# Patient Record
Sex: FEMALE | Race: OTHER | HISPANIC OR LATINO | URBAN - METROPOLITAN AREA
[De-identification: names, ages, dates, MRNs, and addresses within clinical notes are randomized per-mention and may not be internally consistent; named-entity substitution may affect disease eponyms.]

---

## 2020-04-06 ENCOUNTER — EMERGENCY (EMERGENCY)
Facility: HOSPITAL | Age: 35
LOS: 0 days | Discharge: HOME | End: 2020-04-06
Attending: EMERGENCY MEDICINE | Admitting: EMERGENCY MEDICINE
Payer: MEDICAID

## 2020-04-06 VITALS
HEART RATE: 99 BPM | DIASTOLIC BLOOD PRESSURE: 50 MMHG | TEMPERATURE: 99 F | OXYGEN SATURATION: 99 % | SYSTOLIC BLOOD PRESSURE: 105 MMHG | RESPIRATION RATE: 20 BRPM

## 2020-04-06 VITALS
RESPIRATION RATE: 16 BRPM | HEART RATE: 125 BPM | TEMPERATURE: 103 F | DIASTOLIC BLOOD PRESSURE: 81 MMHG | SYSTOLIC BLOOD PRESSURE: 142 MMHG | OXYGEN SATURATION: 96 %

## 2020-04-06 DIAGNOSIS — R50.9 FEVER, UNSPECIFIED: ICD-10-CM

## 2020-04-06 DIAGNOSIS — Z91.013 ALLERGY TO SEAFOOD: ICD-10-CM

## 2020-04-06 DIAGNOSIS — R05 COUGH: ICD-10-CM

## 2020-04-06 DIAGNOSIS — R10.9 UNSPECIFIED ABDOMINAL PAIN: ICD-10-CM

## 2020-04-06 DIAGNOSIS — R30.0 DYSURIA: ICD-10-CM

## 2020-04-06 DIAGNOSIS — R11.10 VOMITING, UNSPECIFIED: ICD-10-CM

## 2020-04-06 DIAGNOSIS — Z91.040 LATEX ALLERGY STATUS: ICD-10-CM

## 2020-04-06 LAB
ALBUMIN SERPL ELPH-MCNC: 4.4 G/DL — SIGNIFICANT CHANGE UP (ref 3.5–5.2)
ALP SERPL-CCNC: 72 U/L — SIGNIFICANT CHANGE UP (ref 30–115)
ALT FLD-CCNC: 17 U/L — SIGNIFICANT CHANGE UP (ref 0–41)
ANION GAP SERPL CALC-SCNC: 12 MMOL/L — SIGNIFICANT CHANGE UP (ref 7–14)
APPEARANCE UR: CLEAR — SIGNIFICANT CHANGE UP
AST SERPL-CCNC: 16 U/L — SIGNIFICANT CHANGE UP (ref 0–41)
BACTERIA # UR AUTO: NEGATIVE — SIGNIFICANT CHANGE UP
BASOPHILS # BLD AUTO: 0.01 K/UL — SIGNIFICANT CHANGE UP (ref 0–0.2)
BASOPHILS NFR BLD AUTO: 0.2 % — SIGNIFICANT CHANGE UP (ref 0–1)
BILIRUB DIRECT SERPL-MCNC: <0.2 MG/DL — SIGNIFICANT CHANGE UP (ref 0–0.2)
BILIRUB INDIRECT FLD-MCNC: >0.4 MG/DL — SIGNIFICANT CHANGE UP (ref 0.2–1.2)
BILIRUB SERPL-MCNC: 0.6 MG/DL — SIGNIFICANT CHANGE UP (ref 0.2–1.2)
BILIRUB UR-MCNC: NEGATIVE — SIGNIFICANT CHANGE UP
BUN SERPL-MCNC: 10 MG/DL — SIGNIFICANT CHANGE UP (ref 10–20)
CALCIUM SERPL-MCNC: 9 MG/DL — SIGNIFICANT CHANGE UP (ref 8.5–10.1)
CHLORIDE SERPL-SCNC: 99 MMOL/L — SIGNIFICANT CHANGE UP (ref 98–110)
CO2 SERPL-SCNC: 24 MMOL/L — SIGNIFICANT CHANGE UP (ref 17–32)
COLOR SPEC: YELLOW — SIGNIFICANT CHANGE UP
CREAT SERPL-MCNC: 0.8 MG/DL — SIGNIFICANT CHANGE UP (ref 0.7–1.5)
DIFF PNL FLD: NEGATIVE — SIGNIFICANT CHANGE UP
EOSINOPHIL # BLD AUTO: 0 K/UL — SIGNIFICANT CHANGE UP (ref 0–0.7)
EOSINOPHIL NFR BLD AUTO: 0 % — SIGNIFICANT CHANGE UP (ref 0–8)
EPI CELLS # UR: 5 /HPF — SIGNIFICANT CHANGE UP (ref 0–5)
GLUCOSE SERPL-MCNC: 104 MG/DL — HIGH (ref 70–99)
GLUCOSE UR QL: NEGATIVE — SIGNIFICANT CHANGE UP
HCT VFR BLD CALC: 42 % — SIGNIFICANT CHANGE UP (ref 37–47)
HGB BLD-MCNC: 14.2 G/DL — SIGNIFICANT CHANGE UP (ref 12–16)
HYALINE CASTS # UR AUTO: 0 /LPF — SIGNIFICANT CHANGE UP (ref 0–7)
IMM GRANULOCYTES NFR BLD AUTO: 0.4 % — HIGH (ref 0.1–0.3)
KETONES UR-MCNC: NEGATIVE — SIGNIFICANT CHANGE UP
LACTATE SERPL-SCNC: 0.9 MMOL/L — SIGNIFICANT CHANGE UP (ref 0.7–2)
LEUKOCYTE ESTERASE UR-ACNC: NEGATIVE — SIGNIFICANT CHANGE UP
LIDOCAIN IGE QN: 44 U/L — SIGNIFICANT CHANGE UP (ref 7–60)
LYMPHOCYTES # BLD AUTO: 0.87 K/UL — LOW (ref 1.2–3.4)
LYMPHOCYTES # BLD AUTO: 19.4 % — LOW (ref 20.5–51.1)
MAGNESIUM SERPL-MCNC: 2.2 MG/DL — SIGNIFICANT CHANGE UP (ref 1.8–2.4)
MCHC RBC-ENTMCNC: 28.3 PG — SIGNIFICANT CHANGE UP (ref 27–31)
MCHC RBC-ENTMCNC: 33.8 G/DL — SIGNIFICANT CHANGE UP (ref 32–37)
MCV RBC AUTO: 83.8 FL — SIGNIFICANT CHANGE UP (ref 81–99)
MONOCYTES # BLD AUTO: 0.37 K/UL — SIGNIFICANT CHANGE UP (ref 0.1–0.6)
MONOCYTES NFR BLD AUTO: 8.2 % — SIGNIFICANT CHANGE UP (ref 1.7–9.3)
NEUTROPHILS # BLD AUTO: 3.22 K/UL — SIGNIFICANT CHANGE UP (ref 1.4–6.5)
NEUTROPHILS NFR BLD AUTO: 71.8 % — SIGNIFICANT CHANGE UP (ref 42.2–75.2)
NITRITE UR-MCNC: NEGATIVE — SIGNIFICANT CHANGE UP
NRBC # BLD: 0 /100 WBCS — SIGNIFICANT CHANGE UP (ref 0–0)
PH UR: 8 — SIGNIFICANT CHANGE UP (ref 5–8)
PLATELET # BLD AUTO: 196 K/UL — SIGNIFICANT CHANGE UP (ref 130–400)
POTASSIUM SERPL-MCNC: 4.2 MMOL/L — SIGNIFICANT CHANGE UP (ref 3.5–5)
POTASSIUM SERPL-SCNC: 4.2 MMOL/L — SIGNIFICANT CHANGE UP (ref 3.5–5)
PROT SERPL-MCNC: 8 G/DL — SIGNIFICANT CHANGE UP (ref 6–8)
PROT UR-MCNC: ABNORMAL
RBC # BLD: 5.01 M/UL — SIGNIFICANT CHANGE UP (ref 4.2–5.4)
RBC # FLD: 13.2 % — SIGNIFICANT CHANGE UP (ref 11.5–14.5)
RBC CASTS # UR COMP ASSIST: 1 /HPF — SIGNIFICANT CHANGE UP (ref 0–4)
SODIUM SERPL-SCNC: 135 MMOL/L — SIGNIFICANT CHANGE UP (ref 135–146)
SP GR SPEC: 1.02 — SIGNIFICANT CHANGE UP (ref 1.01–1.02)
UROBILINOGEN FLD QL: SIGNIFICANT CHANGE UP
WBC # BLD: 4.49 K/UL — LOW (ref 4.8–10.8)
WBC # FLD AUTO: 4.49 K/UL — LOW (ref 4.8–10.8)
WBC UR QL: 1 /HPF — SIGNIFICANT CHANGE UP (ref 0–5)

## 2020-04-06 PROCEDURE — 74177 CT ABD & PELVIS W/CONTRAST: CPT | Mod: 26

## 2020-04-06 PROCEDURE — 71046 X-RAY EXAM CHEST 2 VIEWS: CPT | Mod: 26

## 2020-04-06 PROCEDURE — 99285 EMERGENCY DEPT VISIT HI MDM: CPT

## 2020-04-06 RX ORDER — SODIUM CHLORIDE 9 MG/ML
2000 INJECTION, SOLUTION INTRAVENOUS ONCE
Refills: 0 | Status: COMPLETED | OUTPATIENT
Start: 2020-04-06 | End: 2020-04-06

## 2020-04-06 RX ORDER — DEXAMETHASONE 0.5 MG/5ML
10 ELIXIR ORAL ONCE
Refills: 0 | Status: COMPLETED | OUTPATIENT
Start: 2020-04-06 | End: 2020-04-06

## 2020-04-06 RX ORDER — ACETAMINOPHEN 500 MG
975 TABLET ORAL ONCE
Refills: 0 | Status: COMPLETED | OUTPATIENT
Start: 2020-04-06 | End: 2020-04-06

## 2020-04-06 RX ORDER — MORPHINE SULFATE 50 MG/1
4 CAPSULE, EXTENDED RELEASE ORAL ONCE
Refills: 0 | Status: DISCONTINUED | OUTPATIENT
Start: 2020-04-06 | End: 2020-04-06

## 2020-04-06 RX ORDER — SODIUM CHLORIDE 9 MG/ML
500 INJECTION, SOLUTION INTRAVENOUS ONCE
Refills: 0 | Status: COMPLETED | OUTPATIENT
Start: 2020-04-06 | End: 2020-04-06

## 2020-04-06 RX ORDER — DIPHENHYDRAMINE HCL 50 MG
25 CAPSULE ORAL ONCE
Refills: 0 | Status: COMPLETED | OUTPATIENT
Start: 2020-04-06 | End: 2020-04-06

## 2020-04-06 RX ADMIN — Medication 10 MILLIGRAM(S): at 14:50

## 2020-04-06 RX ADMIN — SODIUM CHLORIDE 500 MILLILITER(S): 9 INJECTION, SOLUTION INTRAVENOUS at 15:35

## 2020-04-06 RX ADMIN — SODIUM CHLORIDE 2000 MILLILITER(S): 9 INJECTION, SOLUTION INTRAVENOUS at 12:38

## 2020-04-06 RX ADMIN — Medication 25 MILLIGRAM(S): at 14:50

## 2020-04-06 RX ADMIN — MORPHINE SULFATE 4 MILLIGRAM(S): 50 CAPSULE, EXTENDED RELEASE ORAL at 12:38

## 2020-04-06 RX ADMIN — Medication 975 MILLIGRAM(S): at 12:37

## 2020-04-06 NOTE — ED PROVIDER NOTE - PATIENT PORTAL LINK FT
You can access the FollowMyHealth Patient Portal offered by [facility] by registering at the following website: [email or web address]. By joining [employer]’s FollowMyHealth portal, you will also be able to view your health information using other applications (apps) compatible with our system.

## 2020-04-06 NOTE — ED ADULT NURSE NOTE - OBJECTIVE STATEMENT
Right side flank and mid back pain, pt is febrile 102.7. Pt recently dx with PNA on po abx. Denies nausea at this time, no dysuria or hematuria

## 2020-04-06 NOTE — ED PROVIDER NOTE - CLINICAL SUMMARY MEDICAL DECISION MAKING FREE TEXT BOX
35 female here for flank pain.  Had screening labs imaging supportive care medications and reevaluation, symptoms improved, plan discussed with patient, will discharge with outpatient management and return and follow up instructions.

## 2020-04-06 NOTE — ED PROVIDER NOTE - NS ED ROS FT
Constitutional: (-) fever  Eyes/ENT: (-) blurry vision, (-) epistaxis  Cardiovascular: (-) chest pain, (-) syncope  Respiratory: (-) cough, (-) shortness of breath  Gastrointestinal: (-) vomiting, (-) diarrhea  Musculoskeletal: (-) neck pain, (-) back pain, (-) joint pain  Integumentary: (-) rash, (-) edema  Neurological: (-) headache, (-) altered mental status  Psychiatric: (-) hallucinations  Allergic/Immunologic: (-) pruritus Constitutional: (+) fever  Eyes/ENT: (-) blurry vision, (-) epistaxis  Cardiovascular: (-) chest pain, (-) syncope  Respiratory: (-) cough, (-) shortness of breath  Gastrointestinal: (+) vomiting, (-) diarrhea  Musculoskeletal: (-) neck pain, (-) back pain, (-) joint pain  Integumentary: (-) rash, (-) edema  Neurological: (-) headache, (-) altered mental status  Psychiatric: (-) hallucinations  Allergic/Immunologic: (-) pruritus

## 2020-04-06 NOTE — ED PROVIDER NOTE - PHYSICAL EXAMINATION
VITAL SIGNS: I have reviewed nursing notes and confirm.  CONSTITUTIONAL: Well-developed; well-nourished; in no acute distress. .  SKIN: skin exam is warm and dry, no acute rash.   HEAD: Normocephalic; atraumatic.  EYES:  EOM intact; conjunctiva and sclera clear.  ENT: No nasal discharge; airway clear. moist oral mucosa;     NECK: Supple; non tender.  CARD: S1, S2 normal; no murmurs, gallops, or rubs. Regular rate and rhythm. posterior tibial and radial pulses 2+  RESP: No wheezes, rales or rhonchi. cta b/l. no use of accessory muscles. no retractions  ABD: Normal bowel sounds; soft; non-distended; non-tender; no rebound.   back: b/l cva tenderness  EXT: Normal ROM. No  cyanosis or edema.  LYMPH: No acute cervical adenopathy.  NEURO: Alert, oriented, grossly unremarkable.    PSYCH: Cooperative, appropriate.

## 2020-04-06 NOTE — ED PROVIDER NOTE - ATTENDING CONTRIBUTION TO CARE
I personally evaluated the patient. I reviewed the Resident’s or Physician Assistant’s note (as assigned above), and agree with the findings and plan except as documented in my note.     35 female here for right flank pain for several days not improving. Admits to vomiting as well, concerned for pyelonephritis.    Recent outpatient workup for similar and diagnosed as pneumonia and given Rx Zithromax.     ROS otherwise unremarkable    PE: female in no distress. CV: pulses intact. CHEST: normal work of breathing. ABD: nondistended. SKIN: normal. EXT: FROM. NEURO: AAO 3 no focal deficits. HEENT: mucosa normal non icteric    Impression: flank pain    Plan: IV labs imaging supportive care and reevaluation

## 2020-04-06 NOTE — ED PROVIDER NOTE - OBJECTIVE STATEMENT
34y/o F w/ hx of asthma, PCOS presents for b/l flank pain r>l constant stabbing pain that feels like pyelo 2-3 days ago, pt with mild dysuria 4 days ago. has taken 3 days of zpack and cefdinir for pneumonia.  pt has been feeling feversih for the last 3 days.  +3 episodes of vomit.  denies sob. no cp.  no diarrhea. no vaginal complaints.

## 2020-04-06 NOTE — ED PROVIDER NOTE - NSFOLLOWUPINSTRUCTIONS_ED_ALL_ED_FT
Please finish antibiotics.  ****If you wish to be tested for the COVID virus please call [phone] for appointment to go to [facility] at [address] or go to your nearest [facility]***     Novel Coronavirus (COVID-19)  The Facts  What is a coronavirus?  Coronaviruses are a large family of viruses that cause illnesses ranging from the common cold  to more severe diseases such as Middle East Respiratory Syndrome (MERS) and Severe Acute  Respiratory Syndrome (SARS).  What is Novel Coronavirus (COVID-19)?  COVID-19 is a new strain of Coronavirus that has not been previously identified in humans. COVID-19  was identified in [facility] [place] in December 2019 (COVID-19). COVID-19 has  since been identified outside of [place], in a growing number of countries internationally, including  the [place].  Where can I find the most recent information about COVID-19?  The Centers for Disease Control and Prevention (CDC) is closely monitoring the outbreak caused by the  COVID-19. For the latest information about COVID- 19, visit the CDC website at  https://www.cdc.gov/coronavirus/index.html  How are coronaviruses spread?  Coronaviruses can be transmitted from person-to- person, usually after close contact with an infected person,  for example, in a household, workplace, or healthcare setting via droplets that become airborne after a cough  or sneeze by an affected person. These droplets can then infect a nearby person. It is likely transmission also  occurs by touching recently contaminated surfaces.  What are the symptoms of coronavirus infection?  It depends on the virus, but common signs include fever and/or respiratory symptoms such as  cough and shortness of breath. In more severe cases, infection can cause pneumonia, severe acute  respiratory syndrome, kidney failure and even death. Fortunately, most cases of COVID-19 have an  illness no different than the influenza “flu”. With a majority of these patients having mild symptoms  and overall mortality which appears to be not much different than the flu.  Is there a treatment for a COVID-19?  There is no specific treatment for disease caused by COVID-19. However, many of the symptoms can  be treated based on the patient’s clinical condition. Supportive care for infected persons can be highly  effective.  What can I do to protect myself?  Washing your hands, covering your cough, and disinfecting surfaces are the best precautionary  measures. It is also advisable to avoid close contact with anyone showing symptoms of respiratory  illness such as coughing and sneezing. Those with symptoms should wear a surgical mask when  around others.  What can I do to protect those around me?  If you have been identified as someone who may be infected with COVID-19, we recommend you  follow the self-isolation procedures outlined below to protect those around you and limit the spread  of this virus.      Recommendations for Patients Advised to Self-Isolate for Possible COVID-19 Exposure  We recommend the below precautionary steps from now until 14 days from when you  returned from your travel or date of your last known possible contact:  - Do not go to work, school, or public areas. Avoid using public transportation, ride-sharing, or  taxis.  - As much as possible, separate yourself from other people in your home. If you can, you should  stay in a room and away from other people in your home. Also, you should use a separate  bathroom, if available.  - Wear the supplied mask whenever you are around other people.  - If you have a non-urgent medical appointment, please reschedule for a later date. If the  appointment is urgent, please call the healthcare provider and tell them that you are on selfisolation for possible COVID-19. This will help the healthcare provider’s office take steps to keep  other people from getting infected or exposed. If you can reschedule routine appointments, do  so.  - Wash your hands often with soap and water for at least 15 to 20 seconds or clean your hands  with an alcohol-based hand  that contains 60 to 95% alcohol, covering all surfaces of  your hands and rubbing them together until they feel dry. Soap and water should be used  preferentially if hands are visibly dirty.  - Cover your mouth and nose with a tissue when you cough or sneeze. Throw used tissues in a  lined trash can; immediately wash your hands.  - Avoid touching your eyes, nose, and mouth with your hands.  - Avoid sharing personal household items. You should not share dishes, drinking glasses, cups,  eating utensils, towels, or bedding with other people or pets in your home. After using these  items, they should be washed thoroughly with soap and water.  - Clean and disinfect all “high-touch” surfaces every day. High touch surfaces include counters,  tabletops, doorknobs, light switches, remote controls, bathroom fixtures, toilets, phones,  keyboards, tablets, and bedside tables. Also, clean any surfaces that may have blood, stool, or  body fluids on them.       If you develop worsening symptoms:  - If you develop worsening symptoms, such as severe shortness of breath, please call [phone] option #9. They will assist you in determining your next steps.  During your time on self-isolation do the following:  - Work from home if you are able to so.  - Limit social isolation by talking with friends and family on the phone or with face-time  - Talk with friends and relatives who don’t live with you about supporting each other if one  household has to be quarantined. For example, agree to drop groceries or other supplies at the  front door.  - Exercise and spend time outdoors away from others if able to do so.    Why didn’t I get tested for novel coronavirus (COVID-19)?  The number of available tests is very limited so strict rules exist for who is allowed to be tested.  [facility] has been authorized to perform testing and is currently working hard to be  able to start providing the test. Such testing is currently reserved for patients who have had  contact with someone infected with the virus, or those who are very sick a plus those who have  traveled to areas identified by the Centers for Disease Control and Prevention (CD) and will  require hospitalization.  What should I do now?  If you are well enough to be discharged home and are not in a high risk group to have  contracted the COVID-19, you should care for yourself at home exactly like you would if you  have Influenza “flu”. Follow all the standard guidelines about washing your hands, covering  your cough, etc.  You should return to the Emergency Department if you develop worse symptoms, trouble  breathing, chest pain, and/or a fever that doesn’t improve with over the counter  acetaminophen

## 2020-04-07 LAB
CULTURE RESULTS: NO GROWTH — SIGNIFICANT CHANGE UP
SPECIMEN SOURCE: SIGNIFICANT CHANGE UP

## 2020-04-10 ENCOUNTER — INPATIENT (INPATIENT)
Facility: HOSPITAL | Age: 35
LOS: 4 days | Discharge: HOME | End: 2020-04-15
Attending: HOSPITALIST | Admitting: HOSPITALIST
Payer: COMMERCIAL

## 2020-04-10 VITALS
HEART RATE: 127 BPM | RESPIRATION RATE: 26 BRPM | OXYGEN SATURATION: 91 % | WEIGHT: 218.04 LBS | SYSTOLIC BLOOD PRESSURE: 115 MMHG | TEMPERATURE: 103 F | DIASTOLIC BLOOD PRESSURE: 80 MMHG | HEIGHT: 64 IN

## 2020-04-10 LAB
ALBUMIN SERPL ELPH-MCNC: 4.2 G/DL — SIGNIFICANT CHANGE UP (ref 3.5–5.2)
ALP SERPL-CCNC: 61 U/L — SIGNIFICANT CHANGE UP (ref 30–115)
ALT FLD-CCNC: 16 U/L — SIGNIFICANT CHANGE UP (ref 0–41)
ANION GAP SERPL CALC-SCNC: 15 MMOL/L — HIGH (ref 7–14)
APPEARANCE UR: CLEAR — SIGNIFICANT CHANGE UP
AST SERPL-CCNC: 20 U/L — SIGNIFICANT CHANGE UP (ref 0–41)
BASOPHILS # BLD AUTO: 0.01 K/UL — SIGNIFICANT CHANGE UP (ref 0–0.2)
BASOPHILS NFR BLD AUTO: 0.1 % — SIGNIFICANT CHANGE UP (ref 0–1)
BILIRUB SERPL-MCNC: 0.8 MG/DL — SIGNIFICANT CHANGE UP (ref 0.2–1.2)
BILIRUB UR-MCNC: NEGATIVE — SIGNIFICANT CHANGE UP
BUN SERPL-MCNC: 6 MG/DL — LOW (ref 10–20)
CALCIUM SERPL-MCNC: 8.9 MG/DL — SIGNIFICANT CHANGE UP (ref 8.5–10.1)
CHLORIDE SERPL-SCNC: 99 MMOL/L — SIGNIFICANT CHANGE UP (ref 98–110)
CO2 SERPL-SCNC: 21 MMOL/L — SIGNIFICANT CHANGE UP (ref 17–32)
COLOR SPEC: SIGNIFICANT CHANGE UP
CREAT SERPL-MCNC: 0.8 MG/DL — SIGNIFICANT CHANGE UP (ref 0.7–1.5)
CRP SERPL-MCNC: 15.61 MG/DL — HIGH (ref 0–0.4)
D DIMER BLD IA.RAPID-MCNC: 239 NG/ML DDU — HIGH (ref 0–230)
DIFF PNL FLD: SIGNIFICANT CHANGE UP
EOSINOPHIL # BLD AUTO: 0 K/UL — SIGNIFICANT CHANGE UP (ref 0–0.7)
EOSINOPHIL NFR BLD AUTO: 0 % — SIGNIFICANT CHANGE UP (ref 0–8)
FERRITIN SERPL-MCNC: 103 NG/ML — SIGNIFICANT CHANGE UP (ref 15–150)
FIBRINOGEN PPP-MCNC: >700 MG/DL — HIGH (ref 204.4–570.6)
GLUCOSE SERPL-MCNC: 121 MG/DL — HIGH (ref 70–99)
GLUCOSE UR QL: NEGATIVE — SIGNIFICANT CHANGE UP
HCT VFR BLD CALC: 41.2 % — SIGNIFICANT CHANGE UP (ref 37–47)
HGB BLD-MCNC: 13.3 G/DL — SIGNIFICANT CHANGE UP (ref 12–16)
IMM GRANULOCYTES NFR BLD AUTO: 0.4 % — HIGH (ref 0.1–0.3)
KETONES UR-MCNC: NEGATIVE — SIGNIFICANT CHANGE UP
LACTATE SERPL-SCNC: 0.9 MMOL/L — SIGNIFICANT CHANGE UP (ref 0.7–2)
LDH SERPL L TO P-CCNC: 354 — HIGH (ref 50–242)
LEUKOCYTE ESTERASE UR-ACNC: NEGATIVE — SIGNIFICANT CHANGE UP
LIDOCAIN IGE QN: 45 U/L — SIGNIFICANT CHANGE UP (ref 7–60)
LYMPHOCYTES # BLD AUTO: 0.83 K/UL — LOW (ref 1.2–3.4)
LYMPHOCYTES # BLD AUTO: 9.3 % — LOW (ref 20.5–51.1)
MCHC RBC-ENTMCNC: 26.2 PG — LOW (ref 27–31)
MCHC RBC-ENTMCNC: 32.3 G/DL — SIGNIFICANT CHANGE UP (ref 32–37)
MCV RBC AUTO: 81.3 FL — SIGNIFICANT CHANGE UP (ref 81–99)
MONOCYTES # BLD AUTO: 0.47 K/UL — SIGNIFICANT CHANGE UP (ref 0.1–0.6)
MONOCYTES NFR BLD AUTO: 5.3 % — SIGNIFICANT CHANGE UP (ref 1.7–9.3)
NEUTROPHILS # BLD AUTO: 7.58 K/UL — HIGH (ref 1.4–6.5)
NEUTROPHILS NFR BLD AUTO: 84.9 % — HIGH (ref 42.2–75.2)
NITRITE UR-MCNC: NEGATIVE — SIGNIFICANT CHANGE UP
NRBC # BLD: 0 /100 WBCS — SIGNIFICANT CHANGE UP (ref 0–0)
PH UR: 8 — SIGNIFICANT CHANGE UP (ref 5–8)
PLATELET # BLD AUTO: 262 K/UL — SIGNIFICANT CHANGE UP (ref 130–400)
POTASSIUM SERPL-MCNC: 4.3 MMOL/L — SIGNIFICANT CHANGE UP (ref 3.5–5)
POTASSIUM SERPL-SCNC: 4.3 MMOL/L — SIGNIFICANT CHANGE UP (ref 3.5–5)
PROCALCITONIN SERPL-MCNC: 0.07 NG/ML — SIGNIFICANT CHANGE UP (ref 0.02–0.1)
PROT SERPL-MCNC: 7.6 G/DL — SIGNIFICANT CHANGE UP (ref 6–8)
PROT UR-MCNC: SIGNIFICANT CHANGE UP
RBC # BLD: 5.07 M/UL — SIGNIFICANT CHANGE UP (ref 4.2–5.4)
RBC # FLD: 13.3 % — SIGNIFICANT CHANGE UP (ref 11.5–14.5)
SODIUM SERPL-SCNC: 135 MMOL/L — SIGNIFICANT CHANGE UP (ref 135–146)
SP GR SPEC: 1.01 — LOW (ref 1.01–1.02)
TROPONIN T SERPL-MCNC: <0.01 NG/ML — SIGNIFICANT CHANGE UP
UROBILINOGEN FLD QL: SIGNIFICANT CHANGE UP
WBC # BLD: 8.93 K/UL — SIGNIFICANT CHANGE UP (ref 4.8–10.8)
WBC # FLD AUTO: 8.93 K/UL — SIGNIFICANT CHANGE UP (ref 4.8–10.8)

## 2020-04-10 PROCEDURE — 71045 X-RAY EXAM CHEST 1 VIEW: CPT | Mod: 26

## 2020-04-10 PROCEDURE — 99285 EMERGENCY DEPT VISIT HI MDM: CPT

## 2020-04-10 PROCEDURE — 93010 ELECTROCARDIOGRAM REPORT: CPT

## 2020-04-10 RX ORDER — ACETAMINOPHEN 500 MG
650 TABLET ORAL EVERY 6 HOURS
Refills: 0 | Status: DISCONTINUED | OUTPATIENT
Start: 2020-04-10 | End: 2020-04-15

## 2020-04-10 RX ORDER — IPRATROPIUM/ALBUTEROL SULFATE 18-103MCG
3 AEROSOL WITH ADAPTER (GRAM) INHALATION
Qty: 0 | Refills: 0 | DISCHARGE

## 2020-04-10 RX ORDER — METFORMIN HYDROCHLORIDE 850 MG/1
1 TABLET ORAL
Qty: 0 | Refills: 0 | DISCHARGE

## 2020-04-10 RX ORDER — ACETAMINOPHEN 500 MG
650 TABLET ORAL ONCE
Refills: 0 | Status: COMPLETED | OUTPATIENT
Start: 2020-04-10 | End: 2020-04-10

## 2020-04-10 RX ORDER — ALBUTEROL 90 UG/1
2 AEROSOL, METERED ORAL EVERY 6 HOURS
Refills: 0 | Status: DISCONTINUED | OUTPATIENT
Start: 2020-04-10 | End: 2020-04-15

## 2020-04-10 RX ORDER — BUDESONIDE AND FORMOTEROL FUMARATE DIHYDRATE 160; 4.5 UG/1; UG/1
2 AEROSOL RESPIRATORY (INHALATION)
Refills: 0 | Status: DISCONTINUED | OUTPATIENT
Start: 2020-04-10 | End: 2020-04-15

## 2020-04-10 RX ORDER — ALBUTEROL 90 UG/1
2 AEROSOL, METERED ORAL
Qty: 0 | Refills: 0 | DISCHARGE

## 2020-04-10 RX ORDER — ACETAMINOPHEN 500 MG
975 TABLET ORAL ONCE
Refills: 0 | Status: DISCONTINUED | OUTPATIENT
Start: 2020-04-10 | End: 2020-04-10

## 2020-04-10 RX ORDER — ENOXAPARIN SODIUM 100 MG/ML
40 INJECTION SUBCUTANEOUS AT BEDTIME
Refills: 0 | Status: DISCONTINUED | OUTPATIENT
Start: 2020-04-10 | End: 2020-04-15

## 2020-04-10 RX ORDER — TRAMADOL HYDROCHLORIDE 50 MG/1
50 TABLET ORAL EVERY 6 HOURS
Refills: 0 | Status: DISCONTINUED | OUTPATIENT
Start: 2020-04-10 | End: 2020-04-15

## 2020-04-10 RX ORDER — HYDROXYCHLOROQUINE SULFATE 200 MG
TABLET ORAL
Refills: 0 | Status: COMPLETED | OUTPATIENT
Start: 2020-04-10 | End: 2020-04-14

## 2020-04-10 RX ORDER — KETOROLAC TROMETHAMINE 30 MG/ML
15 SYRINGE (ML) INJECTION ONCE
Refills: 0 | Status: DISCONTINUED | OUTPATIENT
Start: 2020-04-10 | End: 2020-04-10

## 2020-04-10 RX ORDER — HYDROXYCHLOROQUINE SULFATE 200 MG
800 TABLET ORAL EVERY 24 HOURS
Refills: 0 | Status: COMPLETED | OUTPATIENT
Start: 2020-04-10 | End: 2020-04-10

## 2020-04-10 RX ORDER — SODIUM CHLORIDE 9 MG/ML
1000 INJECTION, SOLUTION INTRAVENOUS ONCE
Refills: 0 | Status: COMPLETED | OUTPATIENT
Start: 2020-04-10 | End: 2020-04-10

## 2020-04-10 RX ORDER — HYDROXYCHLOROQUINE SULFATE 200 MG
400 TABLET ORAL EVERY 24 HOURS
Refills: 0 | Status: COMPLETED | OUTPATIENT
Start: 2020-04-11 | End: 2020-04-14

## 2020-04-10 RX ORDER — PROCHLORPERAZINE MALEATE 5 MG
10 TABLET ORAL ONCE
Refills: 0 | Status: COMPLETED | OUTPATIENT
Start: 2020-04-10 | End: 2020-04-10

## 2020-04-10 RX ORDER — ZINC SULFATE TAB 220 MG (50 MG ZINC EQUIVALENT) 220 (50 ZN) MG
220 TAB ORAL DAILY
Refills: 0 | Status: DISCONTINUED | OUTPATIENT
Start: 2020-04-10 | End: 2020-04-15

## 2020-04-10 RX ORDER — ASCORBIC ACID 60 MG
1000 TABLET,CHEWABLE ORAL DAILY
Refills: 0 | Status: DISCONTINUED | OUTPATIENT
Start: 2020-04-10 | End: 2020-04-15

## 2020-04-10 RX ADMIN — TRAMADOL HYDROCHLORIDE 50 MILLIGRAM(S): 50 TABLET ORAL at 15:58

## 2020-04-10 RX ADMIN — Medication 800 MILLIGRAM(S): at 15:58

## 2020-04-10 RX ADMIN — Medication 15 MILLIGRAM(S): at 05:58

## 2020-04-10 RX ADMIN — TRAMADOL HYDROCHLORIDE 50 MILLIGRAM(S): 50 TABLET ORAL at 20:55

## 2020-04-10 RX ADMIN — Medication 650 MILLIGRAM(S): at 08:56

## 2020-04-10 RX ADMIN — BUDESONIDE AND FORMOTEROL FUMARATE DIHYDRATE 2 PUFF(S): 160; 4.5 AEROSOL RESPIRATORY (INHALATION) at 20:52

## 2020-04-10 RX ADMIN — Medication 650 MILLIGRAM(S): at 15:58

## 2020-04-10 RX ADMIN — SODIUM CHLORIDE 1000 MILLILITER(S): 9 INJECTION, SOLUTION INTRAVENOUS at 08:56

## 2020-04-10 RX ADMIN — Medication 100 MILLIGRAM(S): at 22:36

## 2020-04-10 RX ADMIN — ZINC SULFATE TAB 220 MG (50 MG ZINC EQUIVALENT) 220 MILLIGRAM(S): 220 (50 ZN) TAB at 22:35

## 2020-04-10 RX ADMIN — Medication 30 MILLILITER(S): at 06:53

## 2020-04-10 RX ADMIN — Medication 10 MILLIGRAM(S): at 22:34

## 2020-04-10 NOTE — H&P ADULT - HISTORY OF PRESENT ILLNESS
36y/o F w/ PMH of asthma, PCOS, pyelonephritis, and obesity presenting to ED with CP and SOB since last night and increased upper abdominal/R flank pain. Symptoms started last Saturday,  pt went to Urgent care she was diagnosed with Pneumonia, swabbed for COVID and prescribed Z-pack and Cefdinir which she did complete course. The next day she went to [facility] for worsening abdominal pian, she was given Motrin and discharged home. She came to [facility] on Monday 4/6 with the same complaints of with CC of  Abdominal pain, UA was neg, CT abdomen/pelvis was remarkable for b/l opacities, pt was discharged home as she was saturating fine on RA. Since discharge, pt states she continued to have abdominal pain, now with Dry cough and Worsening SOB so she presented for Evaluation Abdominal pain 9/10,  located in Epigastrium, b/l flanks,  radiating to her back, associated with Nausea, 3 episodes of nbnb vomit and Pleuritic Chest pain     Pt endorses Positive outpatient COVID testing, Fever, chills, dry cough, denies Diarrhea, headache, dizziness, urinary symptoms, calf pain/swelling, no recent travel    In ED:  O2 sat 75% on RA>> placed on 6L NC and came up to 80's. Placed on NRB O2 sat came up to 90's.   Vitals: HR: 127, RR; 27, Tmax: 102.7, labs  significant for lymphopenia

## 2020-04-10 NOTE — ED ADULT NURSE NOTE - OBJECTIVE STATEMENT
pt presents to ed with c/o  CP and SOB since last night and increased upper abdominal/R flank pain. Patient was just seen here in ED on 04/06/20, had full workup. pt was found to have opacities bilaterally and viral PNA. pt tested + for covid outpatient. pt states she has had fevers at home 102.7 in ed. pt c/o cp 2 days. pt denies any vomiting/diarrhea, bloody stool, dysuria.

## 2020-04-10 NOTE — H&P ADULT - NSHPREVIEWOFSYSTEMS_GEN_ALL_CORE
CONSTITUTIONAL: Endorses Fever/chills and generalized weakness    RESPIRATORY: Endorses dyspnea, non-productive cough      CARDIOVASCULAR : Endorses Pleuritic chest pain    GASTROINTESTINAL: Endorses abdominal, nausea and  vomiting, No diarrhea    GENITOURINARY: No dysuria, frequency or hematuria    NEUROLOGICAL: No numbness or tingling

## 2020-04-10 NOTE — ED PROVIDER NOTE - PHYSICAL EXAMINATION
PHYSICAL EXAM: I have reviewed current vital signs.  GENERAL: Mild respiratory distress.  HEAD:  Normocephalic, atraumatic.  EYES: Conjunctiva and sclera clear.  ENT: MMM.  NECK: Supple, FROM.  CHEST/LUNG: Mild tachypnea, speaking in full sentences, 75% on RA, O2 sat up to 90's on NRB. No wheezes/rales. Symmetric expansion.   HEART: Tachycardic, normal S1 and S2; no murmurs, rubs, or gallops.  ABDOMEN: Soft, nontender, nondistended, obese.  EXTREMITIES:  2+ peripheral pulses; FROM.  NEUROLOGY: A&O x 3. Motor 5/5. No focal neurological deficits.   SKIN: Warm and dry.

## 2020-04-10 NOTE — ED ADULT TRIAGE NOTE - CHIEF COMPLAINT QUOTE
34 yo F presents with SOB, chest pain, cough and fever. Patient was seen and tested for COVID19 on 4/6 awaiting results.

## 2020-04-10 NOTE — CONSULT NOTE ADULT - SUBJECTIVE AND OBJECTIVE BOX
SLADE BERGERON  35y, Female  Allergy: latex (Unknown)  No Known Drug Allergies  shellfish (Unknown)      CHIEF COMPLAINT: AHRF + Viral Pneumonia Secondary to COVID-19 (10 Apr 2020 15:00)      LOS      HPI:  36y/o F w/ PMH of asthma, PCOS, pyelonephritis, and obesity presenting to ED with CP and SOB since last night and increased upper abdominal/R flank pain. Symptoms started last Saturday,  pt went to Urgent care she was diagnosed with Pneumonia, swabbed for COVID and prescribed Z-pack and Cefdinir which she did complete course. The next day she went to [facility] for worsening abdominal pian, she was given Motrin and discharged home. She came to [facility] on  with the same complaints of with CC of  Abdominal pain, UA was neg, CT abdomen/pelvis was remarkable for b/l opacities, pt was discharged home as she was saturating fine on RA. Since discharge, pt states she continued to have abdominal pain, now with Dry cough and Worsening SOB so she presented for Evaluation Abdominal pain 9/10,  located in Epigastrium, b/l flanks,  radiating to her back, associated with Nausea, 3 episodes of nbnb vomit and Pleuritic Chest pain     Pt endorses Positive outpatient COVID testing, Fever, chills, dry cough, denies Diarrhea, headache, dizziness, urinary symptoms, calf pain/swelling, no recent travel    In ED:  O2 sat 75% on RA>> placed on 6L NC and came up to 80's. Placed on NRB O2 sat came up to 90's.   Vitals: HR: 127, RR; 27, Tmax: 102.7, labs  significant for lymphopenia (10 Apr 2020 15:00)      INFECTIOUS DISEASE HISTORY:   on 2L , CXR hazy opacities     PAST MEDICAL & SURGICAL HISTORY:  Asthma  PCOS (polycystic ovarian syndrome)      FAMILY HISTORY  non-contributory     SOCIAL HISTORY  Social History:        ROS  General: Denies rigors, nightsweats  HEENT: Denies headache, rhinorrhea, sore throat, eye pain  CV: Denies CP, palpitations  PULM: as noted above   GI: Denies hematemesis, hematochezia, melena  : Denies discharge, hematuria  MSK: as noted above   SKIN: Denies rash, lesions  NEURO: Denies paresthesias, weakness  PSYCH: Denies depression, anxiety    VITALS:  T(F): 100.1, Max: 102.7 (04-10-20 @ 04:48)  HR: 138  BP: 134/55  RR: 18Vital Signs Last 24 Hrs  T(C): 37.8 (10 Apr 2020 14:51), Max: 39.3 (10 Apr 2020 04:48)  T(F): 100.1 (10 Apr 2020 14:51), Max: 102.7 (10 Apr 2020 04:48)  HR: 138 (10 Apr 2020 14:51) (88 - 138)  BP: 134/55 (10 Apr 2020 14:51) (92/58 - 134/55)  BP(mean): --  RR: 18 (10 Apr 2020 14:51) (18 - 26)  SpO2: 99% (10 Apr 2020 14:02) (91% - 100%)    PHYSICAL EXAM:  Gen: obese on NC  HEENT: NCAT  Resp: b/l chest expansion  Neuro: nonfocal     TESTS & MEASUREMENTS:                        13.3   8.93  )-----------( 262      ( 10 Apr 2020 04:30 )             41.2     04-10    135  |  99  |  6<L>  ----------------------------<  121<H>  4.3   |  21  |  0.8    Ca    8.9      10 Apr 2020 04:30    TPro  7.6  /  Alb  4.2  /  TBili  0.8  /  DBili  x   /  AST  20  /  ALT  16  /  AlkPhos  61  10    eGFR if Non African American: 96 mL/min/1.73M2 (04-10-20 @ 04:30)  eGFR if African American: 111 mL/min/1.73M2 (04-10-20 @ 04:30)    LIVER FUNCTIONS - ( 10 Apr 2020 04:30 )  Alb: 4.2 g/dL / Pro: 7.6 g/dL / ALK PHOS: 61 U/L / ALT: 16 U/L / AST: 20 U/L / GGT: x           Urinalysis Basic - ( 10 Apr 2020 04:30 )    Color: Light Yellow / Appearance: Clear / S.007 / pH: x  Gluc: x / Ketone: Negative  / Bili: Negative / Urobili: <2 mg/dL   Blood: x / Protein: Trace / Nitrite: Negative   Leuk Esterase: Negative / RBC: x / WBC x   Sq Epi: x / Non Sq Epi: x / Bacteria: x        Culture - Urine (collected 20 @ 12:35)  Source: .Urine Clean Catch (Midstream)  Final Report (20 @ 16:26):    No growth        Lactate, Blood: 0.9 mmol/L (04-10-20 @ 04:30)  Lactate, Blood: 0.9 mmol/L (20 @ 12:35)      INFECTIOUS DISEASES TESTING  Procalcitonin, Serum: 0.07 ng/mL (04-10-20 @ 04:30)      RADIOLOGY & ADDITIONAL TESTS:  I have personally reviewed the last Chest xray  CXR      CT      CARDIOLOGY TESTING  12 Lead ECG:   Ventricular Rate 124 BPM    Atrial Rate 124 BPM    P-R Interval 138 ms    QRS Duration 70 ms    Q-T Interval 284 ms    QTC Calculation(Bezet) 408 ms    P Axis 52 degrees    R Axis 66 degrees    T Axis 61 degrees    Diagnosis Line Sinus tachycardia  Otherwise normal ECG    Confirmed by NEY OH, LEE ([id number]) on 4/10/2020 6:58:17 AM (04-10-20 @ 04:49)      MEDICATIONS  ascorbic acid 1000  benzonatate 100  budesonide 160 MICROgram(s)/formoterol 4.5 MICROgram(s) Inhaler 2  enoxaparin Injectable 40  hydroxychloroquine   zinc sulfate 220      Weight  Weight (kg): 98.9 (04-10-20 @ 04:48)    ANTIBIOTICS:  hydroxychloroquine   Oral       ALLERGIES:  latex (Unknown)  No Known Drug Allergies  shellfish (Unknown)

## 2020-04-10 NOTE — CONSULT NOTE ADULT - ASSESSMENT
ASSESSMENT  34y/o F w/ PMH of asthma, PCOS, pyelonephritis, and obesity presenting to ED with CP and SOB since last night and increased upper abdominal/R flank pain. Symptoms started last Saturday,  pt went to Urgent care she was diagnosed with Pneumonia, swabbed for COVID and prescribed Z-pack and Cefdinir which she did complete course. The next day she went to [facility] for worsening abdominal pian, she was given Motrin and discharged home. She came to [facility] on Monday 4/6 with the same complaints of with CC of  Abdominal pain, UA was neg, CT abdomen/pelvis was remarkable for b/l opacities, pt was discharged home as she was saturating fine on RA. Since discharge, pt states she continued to have abdominal pain, now with Dry cough and Worsening SOB    IMPRESSION  # COVID-19 PNA PNA    CXR hazy bilateral opacities     on 2L    recent CTAP GGOS    Procalcitonin, Serum: 0.07 ng/mL (04-10-20 @ 04:30)  #Flank pain- recent UA/UCX NEGATIVE and CTAP unremarkable   #Sepsis on admission  T>101F, Pulse>90  #Morbid Obesity BMI (kg/m2): 37.4  #Cytokine Release Syndrome LABS  C-Reactive Protein, Serum: 15.61 mg/dL (04-10-20 @ 04:30)  D-Dimer Assay, Quantitative: 239 ng/mL DDU (04-10-20 @ 04:30)  Ferritin, Serum: 103 ng/mL (04-10-20 @ 04:30)    RECOMMENDATIONS  - BCX  - complete 5 days of plaquenil  - discharge when POSITIVEsible    Spectra [id number] ASSESSMENT  36y/o F w/ PMH of asthma, PCOS, pyelonephritis, and obesity presenting to ED with CP and SOB since last night and increased upper abdominal/R flank pain. Symptoms started last Saturday,  pt went to Urgent care she was diagnosed with Pneumonia, swabbed for COVID and prescribed Z-pack and Cefdinir which she did complete course. The next day she went to [facility] for worsening abdominal pian, she was given Motrin and discharged home. She came to [facility] on Monday 4/6 with the same complaints of with CC of  Abdominal pain, UA was neg, CT abdomen/pelvis was remarkable for b/l opacities, pt was discharged home as she was saturating fine on RA. Since discharge, pt states she continued to have abdominal pain, now with Dry cough and Worsening SOB    IMPRESSION  # COVID-19 PNA PNA    CXR hazy bilateral opacities     on 2L    recent CTAP GGOS    Procalcitonin, Serum: 0.07 ng/mL (04-10-20 @ 04:30)  #Flank pain- recent UA/UCX NEGATIVE and CTAP unremarkable   #Sepsis on admission  T>101F, Pulse>90  #Morbid Obesity BMI (kg/m2): 37.4  #Cytokine Release Syndrome LABS  C-Reactive Protein, Serum: 15.61 mg/dL (04-10-20 @ 04:30)  D-Dimer Assay, Quantitative: 239 ng/mL DDU (04-10-20 @ 04:30)  Ferritin, Serum: 103 ng/mL (04-10-20 @ 04:30)    RECOMMENDATIONS  - BCX  - complete 5 days of plaquenil  - discharge when Possible    Spectra [id number]

## 2020-04-10 NOTE — ED PROVIDER NOTE - NS ED ROS FT
Constitutional:  +F/c.  Eyes:  No visual changes, eye pain, or discharge.  ENT:  No sore throat.  Neck:  No neck pain or stiffness.  Cardiac:  +CP, no edema.  Resp:  +Cough/SOB. No hemoptysis.  GI:  +Abd pain. No nausea, vomiting, or diarrhea.  :  +Urinary frequency.  MSK:  +Myalgias.  Neuro:  +Weakness. No dizziness.  Skin:  No skin rash.

## 2020-04-10 NOTE — ED PROVIDER NOTE - ATTENDING CONTRIBUTION TO CARE
34yo F history of asthma PCOS presenting with shortness of breath. +f/c cough. COVID positive. Also c/o R flank pain. Seen here a few days ago for similar pain, CTAP showing no acute findings in abdomen but lungs concerning for covid.   Constitutional: tachypneic  Eyes: PERRLA. Extraocular movements intact, no entrapment. Conjunctiva normal.   ENT: No nasal discharge. Moist mucus membranes.  Neck: Supple, non tender, full range of motion.  CV: regular tachycardic   Pulm: tachypneic  Abd: soft NT ND +BS.   Ext: Warm and well perfused x4, moving all extremities, no edema.   Psy: Cooperative, appropriate.   Skin: Warm, dry, no rash  Neuro: CN2-12 grossly intact no sensory or motor deficits throughout, no drift

## 2020-04-10 NOTE — H&P ADULT - ASSESSMENT
36y/o F w/ PMH of asthma, PCOS, and obesity presenting to ED with CP and SOB since last night and increased upper abdominal/R flank pain.      AHRF secondary to Viral Pneumonia likely secondary to COVID-19  -Septic on admission: HR:127, Tmax: 102.7, RR: 27, Hypoxia, Lactate wnl  -CXR and CT:  B/L opacities, suspicious for COVID-19  -Tested positive outpatient   -On Admission, Desaturated to 75% on RA>>6L: 80's    ***Now on NRB: 90's   - f/u procalcitonin, CRP, ferritin  - Started on hydroxychloroquine, Baseline QTC: 408      ****EP consult place for QTC monitoring   - Tylenol PRN for fevers, Tessalon for Cough  -Supportive care with  fluids, Vitamin C and Zinc   -Trend CBC and LFTs  - f/u ID consult    Acute Abdominal Pain: likely secondary to COVID-19  -Located Epigastrium, b/l flanks and radiated to the back   -Associated Nausea and vomiting    -sometimes the initial presenting symptom for coronavirus   -CTA/P : non-revealing for abdominal pathology   -UA and UCx: Neg, was recently sent  again    -Tramadol for moderate pain, can add Oxycodone 10mg for severe pain     H/O Asthma  -c/w Inhalers, no Nebulizers     H/O PCOS  -Takes Metformin at home     DVT PPX: Lovenox  GI PPX: not indicated  FULL CODE  Dispo: from home  -Pending clinical Improvement 36y/o F w/ PMH of asthma, PCOS, and obesity presenting to ED with CP and SOB since last night and increased upper abdominal/R flank pain.      AHRF secondary to Viral Pneumonia likely secondary to COVID-19  -Septic on admission: HR:127, Tmax: 102.7, RR: 27, Hypoxia, Lactate wnl  -CXR and CT:  B/L opacities, suspicious for COVID-19  -Tested positive outpatient   -On Admission, Desaturated to 75% on RA>>6L: 80's    ***Now on NRB: 90's   - f/u procalcitonin, CRP, ferritin  - Started on hydroxychloroquine, Baseline QTC: 408      ****EP consult place for QTC monitoring   - Tylenol PRN for fevers, Tessalon for Cough  -Supportive care with  fluids, Vitamin C and Zinc   -Trend CBC and LFTs  - f/u ID consult    Acute Abdominal Pain: likely secondary to COVID-19  -Located Epigastrium, b/l flanks and radiated to the back   -Associated Nausea and vomiting    -sometimes the initial presenting symptom for coronavirus   -CTA/P : non-revealing for abdominal pathology   -UA and UCx: Neg, was recently sent  again    -Tramadol for moderate pain, can add Oxycodone 10mg for severe pain   -F/up Renal/ bladder US and serum pregnancy test     H/O Asthma  -c/w Inhalers, no Nebulizers     H/O PCOS  -Takes Metformin at home     DVT PPX: Lovenox  GI PPX: not indicated  FULL CODE  Dispo: from home  -Pending clinical Improvement

## 2020-04-10 NOTE — ED PROVIDER NOTE - PROGRESS NOTE DETAILS
Dienes- patient with O2 sat 75% on RA, placed on 6L NC and came up to 80's. Placed on NRB 2/2 hypoxia with NC and O2 sat came up to 90's. Patient's tachycardiac decreased from 120's down to 100's after being placed on O2. Tachypnea improved. Dienes- patient with O2 sat 75% on RA, placed on 6L NC and came up to 80's. Placed on NRB 2/2 hypoxia with NC and O2 sat came up to 90's. Patient's tachycardiac decreased from 120's down to 100's after being placed on O2. Tachypnea improved. No wheezing. IL: pt feeling improved, WOB and sat improved on nrb. s/o to Dr. Garcia pending labs, imaging, reeval pt received as so from dr walls. sitting comfortably 4L NC, 98%, case endorsed to amol leroy

## 2020-04-10 NOTE — H&P ADULT - ATTENDING COMMENTS
34y/o F w/ PMH of asthma, PCOS, and obesity presenting to ED with CP and SOB. Symptoms Started 4/3. Gradually worsened now. increased upper abdominal/R flank pain, pleuritic Chest pain since 1 night PTA.     # Acute hypoxemic respi failure 2/2 Acute COVID-19 Viral PNA:  -Septic on admission: HR:127, Tmax: 102.7, RR: 27, Hypoxia, Lactate wnl  -CXR and CT:  Bibasal consolidations and interstitial opacities,  -Tested Covid positive outpatient   -On Admission, Desaturated to 75% on RA>>6L: 80's    ***Now on NRB: 90's   - Started on hydroxychloroquine (4/11), Baseline QTC: 408     ****EP consult place for QTC monitoring   IV Solumedrol 50mg Q12 (given she is in inflammatory phase)   procalcitonin 0.07    - f/u CRP, ferritin    STAT CTAngio to r/o PE given her pleuritic chest pains.      - Tylenol PRN for fevers, Tessalon for Cough  -Supportive care with  fluids, Vitamin C and Zinc   -Trend CBC and LFTs  - f/u ID consult    Acute Abdominal Pain: likely secondary to COVID-19 vs r/o PE.   -Located Epigastrium, b/l flanks and radiated to the back   -Associated Nausea and vomiting      -CTA/P : non-revealing for abdominal pathology   -UA and UCx: Neg, was recently sent  again    -Tramadol for moderate pain (watch QTc), can add Oxycodone 10mg for severe pain   -F/up Renal/ bladder US and serum pregnancy test   Patient thinks she is not pregnant. Last delivery May.  Says LMP December but she has PCOS.   U preg ordered.   Benefits of stat CTA outweigh risks.     H/O Asthma  -c/w Inhalers, no Nebulizers     H/O PCOS  -Takes Metformin at home     DVT PPX: Lovenox  GI PPX: not indicated  FULL CODE  Dispo: from home  -Pending clinical Improvement   Prognosis guarded. 34y/o F w/ PMH of asthma, PCOS, and obesity presenting to ED with CP and SOB. Symptoms Started 4/3. Gradually worsened now. increased upper abdominal/R flank pain, pleuritic Chest pain since 1 night PTA.     # Acute hypoxemic respi failure 2/2 Acute COVID-19 Viral PNA:  -Septic on admission: HR:127, Tmax: 102.7, RR: 27, Hypoxia, Lactate wnl  -CXR and CT:  Bibasal consolidations and interstitial opacities,  -Tested Covid positive outpatient   -On Admission, Desaturated to 75% on RA>>6L: 80's    ***Now on NRB: 90's   - Started on hydroxychloroquine (4/11), Baseline QTC: 408     ****EP consult place for QTC monitoring   IV Solumedrol 50mg Q12 (given she is in inflammatory phase)   procalcitonin 0.07    - f/u CRP, ferritin    STAT CTAngio to r/o PE given her pleuritic chest pains.      - Tylenol PRN for fevers, Tessalon for Cough  -Supportive care with  fluids, Vitamin C and Zinc   -Trend CBC and LFTs  - f/u ID consult    Acute Abdominal Pain: likely secondary to COVID-19 vs r/o PE.   -Located Epigastrium, b/l flanks and radiated to the back   -Associated Nausea and vomiting      -CTA/P : non-revealing for abdominal pathology   -UA and UCx: Neg, was recently sent  again    -Tramadol for moderate pain (watch QTc), can add Oxycodone 10mg for severe pain   -F/up Renal/ bladder US and serum pregnancy test   Patient thinks she is not pregnant. Last delivery May.  Says LMP December but she has PCOS.   U preg negative.  Benefits of stat CTA outweigh risks.     H/O Asthma  -c/w Inhalers, no Nebulizers     H/O PCOS  -Takes Metformin at home     DVT PPX: Lovenox  GI PPX: not indicated  FULL CODE  Dispo: from home  -Pending clinical Improvement   Prognosis guarded.

## 2020-04-10 NOTE — ED PROVIDER NOTE - OBJECTIVE STATEMENT
34y/o F w/ PMH of asthma, PCOS, pyelonephritis, and obesity presenting to ED with CP and SOB since last night and increased upper abdominal/R flank pain. Patient was just seen here in ED on 04/06/20, had w/u including CT abdomen/pelvis/UA w/ cx/labs, found to have opacities bilaterally and viral PNA. Was given covid info, tested + for covid outpatient. +Fevers (temp of 102.7 in ED), +urinary frequency. Endorses substernal CP, moderate, constant x 2 days, non-radiating, burning/sharp. Denies any vomiting/diarrhea, bloody stool, dysuria, vaginal bleeding/discharge, or injuries/traumas/falls. Patient took 3 days of z-ramos and Cefdinir for PNA outpatient. Nonsmoker. Used her Albuterol pump at home with some relief.

## 2020-04-10 NOTE — H&P ADULT - NSHPLABSRESULTS_GEN_ALL_CORE
LABS:                          13.3   8.93  )-----------( 262      ( 10 Apr 2020 04:30 )             41.2     04-10    135  |  99  |  6<L>  ----------------------------<  121<H>  4.3   |  21  |  0.8    Ca    8.9      10 Apr 2020 04:30    TPro  7.6  /  Alb  4.2  /  TBili  0.8  /  DBili  x   /  AST  20  /  ALT  16  /  AlkPhos  61  04-10          Urinalysis Basic - ( 10 Apr 2020 04:30 )    Color: Light Yellow / Appearance: Clear / S.007 / pH: x  Gluc: x / Ketone: Negative  / Bili: Negative / Urobili: <2 mg/dL   Blood: x / Protein: Trace / Nitrite: Negative   Leuk Esterase: Negative / RBC: x / WBC x   Sq Epi: x / Non Sq Epi: x / Bacteria: x        Lactate Trend  -10 @ 04:30 Lactate:0.9   - @ 12:35 Lactate:0.9     CARDIAC MARKERS ( 10 Apr 2020 09:00 )  x     / <0.01 ng/mL / x     / x     / x          CAPILLARY BLOOD GLUCOSE    Culture Results:   No growth ( @ 12:35)      RADIOLOGY:    EKGS:

## 2020-04-10 NOTE — ED PROVIDER NOTE - CLINICAL SUMMARY MEDICAL DECISION MAKING FREE TEXT BOX
36yo F history of asthma PCOS presenting with shortness of breath. +f/c cough. labs ekg imaging reviewed. pt hypoxic to 60s on RA. minimally improved on 6LNC. placed on NRB with significant improvement. will admit ED work up reviewed and patient has symptomatic COVID. She is comfortable on nasal cannula oxygen. Patient is extremely dyspneic on exertion and desat's.  She monitored her home O2 sat and it went as low as 60%. Patient failed outpatient management.  ED work up reviewed.  Will admit for COVID treatment and supplemental oxygen.

## 2020-04-11 LAB
ALBUMIN SERPL ELPH-MCNC: 3.5 G/DL — SIGNIFICANT CHANGE UP (ref 3.5–5.2)
ALP SERPL-CCNC: 52 U/L — SIGNIFICANT CHANGE UP (ref 30–115)
ALT FLD-CCNC: 16 U/L — SIGNIFICANT CHANGE UP (ref 0–41)
ANION GAP SERPL CALC-SCNC: 12 MMOL/L — SIGNIFICANT CHANGE UP (ref 7–14)
AST SERPL-CCNC: 18 U/L — SIGNIFICANT CHANGE UP (ref 0–41)
BASOPHILS # BLD AUTO: 0.01 K/UL — SIGNIFICANT CHANGE UP (ref 0–0.2)
BASOPHILS NFR BLD AUTO: 0.1 % — SIGNIFICANT CHANGE UP (ref 0–1)
BILIRUB SERPL-MCNC: 0.6 MG/DL — SIGNIFICANT CHANGE UP (ref 0.2–1.2)
BUN SERPL-MCNC: 7 MG/DL — LOW (ref 10–20)
CALCIUM SERPL-MCNC: 8.7 MG/DL — SIGNIFICANT CHANGE UP (ref 8.5–10.1)
CHLORIDE SERPL-SCNC: 102 MMOL/L — SIGNIFICANT CHANGE UP (ref 98–110)
CHOLEST SERPL-MCNC: 146 MG/DL — SIGNIFICANT CHANGE UP (ref 100–200)
CO2 SERPL-SCNC: 25 MMOL/L — SIGNIFICANT CHANGE UP (ref 17–32)
CREAT SERPL-MCNC: 0.8 MG/DL — SIGNIFICANT CHANGE UP (ref 0.7–1.5)
CULTURE RESULTS: NO GROWTH — SIGNIFICANT CHANGE UP
EOSINOPHIL # BLD AUTO: 0.01 K/UL — SIGNIFICANT CHANGE UP (ref 0–0.7)
EOSINOPHIL NFR BLD AUTO: 0.1 % — SIGNIFICANT CHANGE UP (ref 0–8)
GLUCOSE SERPL-MCNC: 112 MG/DL — HIGH (ref 70–99)
HCG UR QL: NEGATIVE — SIGNIFICANT CHANGE UP
HCT VFR BLD CALC: 37.7 % — SIGNIFICANT CHANGE UP (ref 37–47)
HDLC SERPL-MCNC: 36 MG/DL — LOW
HGB BLD-MCNC: 12.2 G/DL — SIGNIFICANT CHANGE UP (ref 12–16)
IMM GRANULOCYTES NFR BLD AUTO: 0.5 % — HIGH (ref 0.1–0.3)
LIPID PNL WITH DIRECT LDL SERPL: 92 MG/DL — SIGNIFICANT CHANGE UP (ref 4–129)
LYMPHOCYTES # BLD AUTO: 0.93 K/UL — LOW (ref 1.2–3.4)
LYMPHOCYTES # BLD AUTO: 10.9 % — LOW (ref 20.5–51.1)
MCHC RBC-ENTMCNC: 27 PG — SIGNIFICANT CHANGE UP (ref 27–31)
MCHC RBC-ENTMCNC: 32.4 G/DL — SIGNIFICANT CHANGE UP (ref 32–37)
MCV RBC AUTO: 83.4 FL — SIGNIFICANT CHANGE UP (ref 81–99)
MONOCYTES # BLD AUTO: 0.44 K/UL — SIGNIFICANT CHANGE UP (ref 0.1–0.6)
MONOCYTES NFR BLD AUTO: 5.2 % — SIGNIFICANT CHANGE UP (ref 1.7–9.3)
NEUTROPHILS # BLD AUTO: 7.09 K/UL — HIGH (ref 1.4–6.5)
NEUTROPHILS NFR BLD AUTO: 83.2 % — HIGH (ref 42.2–75.2)
NRBC # BLD: 0 /100 WBCS — SIGNIFICANT CHANGE UP (ref 0–0)
PLATELET # BLD AUTO: 240 K/UL — SIGNIFICANT CHANGE UP (ref 130–400)
POTASSIUM SERPL-MCNC: 4.2 MMOL/L — SIGNIFICANT CHANGE UP (ref 3.5–5)
POTASSIUM SERPL-SCNC: 4.2 MMOL/L — SIGNIFICANT CHANGE UP (ref 3.5–5)
PROT SERPL-MCNC: 6.6 G/DL — SIGNIFICANT CHANGE UP (ref 6–8)
RBC # BLD: 4.52 M/UL — SIGNIFICANT CHANGE UP (ref 4.2–5.4)
RBC # FLD: 13.3 % — SIGNIFICANT CHANGE UP (ref 11.5–14.5)
SODIUM SERPL-SCNC: 139 MMOL/L — SIGNIFICANT CHANGE UP (ref 135–146)
SPECIMEN SOURCE: SIGNIFICANT CHANGE UP
TOTAL CHOLESTEROL/HDL RATIO MEASUREMENT: 4.1 RATIO — SIGNIFICANT CHANGE UP (ref 4–5.5)
TRIGL SERPL-MCNC: 101 MG/DL — SIGNIFICANT CHANGE UP (ref 10–149)
TROPONIN T SERPL-MCNC: <0.01 NG/ML — SIGNIFICANT CHANGE UP
WBC # BLD: 8.52 K/UL — SIGNIFICANT CHANGE UP (ref 4.8–10.8)
WBC # FLD AUTO: 8.52 K/UL — SIGNIFICANT CHANGE UP (ref 4.8–10.8)

## 2020-04-11 PROCEDURE — 99223 1ST HOSP IP/OBS HIGH 75: CPT

## 2020-04-11 PROCEDURE — 71275 CT ANGIOGRAPHY CHEST: CPT | Mod: 26

## 2020-04-11 RX ORDER — PROCHLORPERAZINE MALEATE 5 MG
10 TABLET ORAL ONCE
Refills: 0 | Status: COMPLETED | OUTPATIENT
Start: 2020-04-11 | End: 2020-04-11

## 2020-04-11 RX ADMIN — BUDESONIDE AND FORMOTEROL FUMARATE DIHYDRATE 2 PUFF(S): 160; 4.5 AEROSOL RESPIRATORY (INHALATION) at 22:03

## 2020-04-11 RX ADMIN — TRAMADOL HYDROCHLORIDE 50 MILLIGRAM(S): 50 TABLET ORAL at 05:33

## 2020-04-11 RX ADMIN — Medication 50 MILLIGRAM(S): at 18:29

## 2020-04-11 RX ADMIN — Medication 100 MILLIGRAM(S): at 22:03

## 2020-04-11 RX ADMIN — Medication 100 MILLIGRAM(S): at 05:33

## 2020-04-11 RX ADMIN — TRAMADOL HYDROCHLORIDE 50 MILLIGRAM(S): 50 TABLET ORAL at 14:08

## 2020-04-11 RX ADMIN — ZINC SULFATE TAB 220 MG (50 MG ZINC EQUIVALENT) 220 MILLIGRAM(S): 220 (50 ZN) TAB at 12:24

## 2020-04-11 RX ADMIN — Medication 1000 MILLIGRAM(S): at 12:25

## 2020-04-11 RX ADMIN — Medication 10 MILLIGRAM(S): at 12:25

## 2020-04-11 RX ADMIN — Medication 400 MILLIGRAM(S): at 14:11

## 2020-04-11 RX ADMIN — BUDESONIDE AND FORMOTEROL FUMARATE DIHYDRATE 2 PUFF(S): 160; 4.5 AEROSOL RESPIRATORY (INHALATION) at 09:11

## 2020-04-11 RX ADMIN — Medication 50 MILLIGRAM(S): at 12:25

## 2020-04-11 RX ADMIN — Medication 100 MILLIGRAM(S): at 13:53

## 2020-04-11 RX ADMIN — ENOXAPARIN SODIUM 40 MILLIGRAM(S): 100 INJECTION SUBCUTANEOUS at 22:03

## 2020-04-12 LAB
ALBUMIN SERPL ELPH-MCNC: 4 G/DL — SIGNIFICANT CHANGE UP (ref 3.5–5.2)
ALP SERPL-CCNC: 61 U/L — SIGNIFICANT CHANGE UP (ref 30–115)
ALT FLD-CCNC: 27 U/L — SIGNIFICANT CHANGE UP (ref 0–41)
ANION GAP SERPL CALC-SCNC: 15 MMOL/L — HIGH (ref 7–14)
AST SERPL-CCNC: 19 U/L — SIGNIFICANT CHANGE UP (ref 0–41)
BILIRUB SERPL-MCNC: 0.4 MG/DL — SIGNIFICANT CHANGE UP (ref 0.2–1.2)
BUN SERPL-MCNC: 8 MG/DL — LOW (ref 10–20)
CALCIUM SERPL-MCNC: 9.4 MG/DL — SIGNIFICANT CHANGE UP (ref 8.5–10.1)
CHLORIDE SERPL-SCNC: 98 MMOL/L — SIGNIFICANT CHANGE UP (ref 98–110)
CO2 SERPL-SCNC: 23 MMOL/L — SIGNIFICANT CHANGE UP (ref 17–32)
CREAT SERPL-MCNC: 0.6 MG/DL — LOW (ref 0.7–1.5)
D DIMER BLD IA.RAPID-MCNC: 162 NG/ML DDU — SIGNIFICANT CHANGE UP (ref 0–230)
GLUCOSE BLDC GLUCOMTR-MCNC: 152 MG/DL — HIGH (ref 70–99)
GLUCOSE BLDC GLUCOMTR-MCNC: 164 MG/DL — HIGH (ref 70–99)
GLUCOSE SERPL-MCNC: 200 MG/DL — HIGH (ref 70–99)
HCT VFR BLD CALC: 39.5 % — SIGNIFICANT CHANGE UP (ref 37–47)
HGB BLD-MCNC: 12.7 G/DL — SIGNIFICANT CHANGE UP (ref 12–16)
MAGNESIUM SERPL-MCNC: 2.3 MG/DL — SIGNIFICANT CHANGE UP (ref 1.8–2.4)
MCHC RBC-ENTMCNC: 26.5 PG — LOW (ref 27–31)
MCHC RBC-ENTMCNC: 32.2 G/DL — SIGNIFICANT CHANGE UP (ref 32–37)
MCV RBC AUTO: 82.3 FL — SIGNIFICANT CHANGE UP (ref 81–99)
NRBC # BLD: 0 /100 WBCS — SIGNIFICANT CHANGE UP (ref 0–0)
PHOSPHATE SERPL-MCNC: 3.3 MG/DL — SIGNIFICANT CHANGE UP (ref 2.1–4.9)
PLATELET # BLD AUTO: 317 K/UL — SIGNIFICANT CHANGE UP (ref 130–400)
POTASSIUM SERPL-MCNC: 3.9 MMOL/L — SIGNIFICANT CHANGE UP (ref 3.5–5)
POTASSIUM SERPL-SCNC: 3.9 MMOL/L — SIGNIFICANT CHANGE UP (ref 3.5–5)
PROT SERPL-MCNC: 7.8 G/DL — SIGNIFICANT CHANGE UP (ref 6–8)
RBC # BLD: 4.8 M/UL — SIGNIFICANT CHANGE UP (ref 4.2–5.4)
RBC # FLD: 13.1 % — SIGNIFICANT CHANGE UP (ref 11.5–14.5)
SODIUM SERPL-SCNC: 136 MMOL/L — SIGNIFICANT CHANGE UP (ref 135–146)
WBC # BLD: 7.29 K/UL — SIGNIFICANT CHANGE UP (ref 4.8–10.8)
WBC # FLD AUTO: 7.29 K/UL — SIGNIFICANT CHANGE UP (ref 4.8–10.8)

## 2020-04-12 PROCEDURE — 99233 SBSQ HOSP IP/OBS HIGH 50: CPT

## 2020-04-12 RX ORDER — INSULIN LISPRO 100/ML
VIAL (ML) SUBCUTANEOUS
Refills: 0 | Status: DISCONTINUED | OUTPATIENT
Start: 2020-04-12 | End: 2020-04-15

## 2020-04-12 RX ORDER — AZITHROMYCIN 500 MG/1
250 TABLET, FILM COATED ORAL EVERY 24 HOURS
Refills: 0 | Status: DISCONTINUED | OUTPATIENT
Start: 2020-04-13 | End: 2020-04-13

## 2020-04-12 RX ORDER — DEXTROSE 50 % IN WATER 50 %
12.5 SYRINGE (ML) INTRAVENOUS ONCE
Refills: 0 | Status: DISCONTINUED | OUTPATIENT
Start: 2020-04-12 | End: 2020-04-15

## 2020-04-12 RX ORDER — GLUCAGON INJECTION, SOLUTION 0.5 MG/.1ML
1 INJECTION, SOLUTION SUBCUTANEOUS ONCE
Refills: 0 | Status: DISCONTINUED | OUTPATIENT
Start: 2020-04-12 | End: 2020-04-15

## 2020-04-12 RX ORDER — DEXTROSE 50 % IN WATER 50 %
25 SYRINGE (ML) INTRAVENOUS ONCE
Refills: 0 | Status: DISCONTINUED | OUTPATIENT
Start: 2020-04-12 | End: 2020-04-15

## 2020-04-12 RX ORDER — SODIUM CHLORIDE 9 MG/ML
1000 INJECTION, SOLUTION INTRAVENOUS
Refills: 0 | Status: DISCONTINUED | OUTPATIENT
Start: 2020-04-12 | End: 2020-04-15

## 2020-04-12 RX ORDER — DEXTROSE 50 % IN WATER 50 %
15 SYRINGE (ML) INTRAVENOUS ONCE
Refills: 0 | Status: DISCONTINUED | OUTPATIENT
Start: 2020-04-12 | End: 2020-04-15

## 2020-04-12 RX ORDER — AZITHROMYCIN 500 MG/1
500 TABLET, FILM COATED ORAL ONCE
Refills: 0 | Status: COMPLETED | OUTPATIENT
Start: 2020-04-12 | End: 2020-04-12

## 2020-04-12 RX ADMIN — Medication 1000 MILLIGRAM(S): at 11:36

## 2020-04-12 RX ADMIN — Medication 50 MILLIGRAM(S): at 06:33

## 2020-04-12 RX ADMIN — BUDESONIDE AND FORMOTEROL FUMARATE DIHYDRATE 2 PUFF(S): 160; 4.5 AEROSOL RESPIRATORY (INHALATION) at 09:02

## 2020-04-12 RX ADMIN — Medication 100 MILLIGRAM(S): at 21:33

## 2020-04-12 RX ADMIN — Medication 100 MILLIGRAM(S): at 13:09

## 2020-04-12 RX ADMIN — TRAMADOL HYDROCHLORIDE 50 MILLIGRAM(S): 50 TABLET ORAL at 07:07

## 2020-04-12 RX ADMIN — BUDESONIDE AND FORMOTEROL FUMARATE DIHYDRATE 2 PUFF(S): 160; 4.5 AEROSOL RESPIRATORY (INHALATION) at 21:34

## 2020-04-12 RX ADMIN — TRAMADOL HYDROCHLORIDE 50 MILLIGRAM(S): 50 TABLET ORAL at 14:50

## 2020-04-12 RX ADMIN — Medication 400 MILLIGRAM(S): at 14:16

## 2020-04-12 RX ADMIN — ZINC SULFATE TAB 220 MG (50 MG ZINC EQUIVALENT) 220 MILLIGRAM(S): 220 (50 ZN) TAB at 11:36

## 2020-04-12 RX ADMIN — Medication 100 MILLIGRAM(S): at 06:33

## 2020-04-12 RX ADMIN — ENOXAPARIN SODIUM 40 MILLIGRAM(S): 100 INJECTION SUBCUTANEOUS at 21:33

## 2020-04-12 RX ADMIN — Medication 50 MILLIGRAM(S): at 18:08

## 2020-04-12 NOTE — PROGRESS NOTE ADULT - ASSESSMENT
ASSESSMENT  34y/o F w/ PMH of asthma, PCOS, pyelonephritis, and obesity presenting to ED with CP and SOB since last night and increased upper abdominal/R flank pain. Symptoms started last Saturday,  pt went to Urgent care she was diagnosed with Pneumonia, swabbed for COVID and prescribed Z-pack and Cefdinir which she did complete course. The next day she went to [facility] for worsening abdominal pian, she was given Motrin and discharged home. She came to [facility] on Monday 4/6 with the same complaints of with CC of  Abdominal pain, UA was neg, CT abdomen/pelvis was remarkable for b/l opacities, pt was discharged home as she was saturating fine on RA. Since discharge, pt states she continued to have abdominal pain, now with Dry cough and Worsening SOB    IMPRESSION  # COVID-19 PNA PNA    CXR hazy bilateral opacities. CT Chest no PE , GGOs     on 2L    recent CTAP GGOS    Procalcitonin, Serum: 0.07 ng/mL (04-10-20 @ 04:30)  #Flank pain- recent UA/UCX NEGATIVE and CTAP unremarkable   #Sepsis on admission  T>101F, Pulse>90  #Morbid Obesity BMI (kg/m2): 37.4  #Does not meet criteria for Cytokine Release Syndrome   C-Reactive Protein, Serum: 15.61 mg/dL (04-10-20 @ 04:30)  D-Dimer Assay, Quantitative: 239 ng/mL DDU (04-10-20 @ 04:30)  Ferritin, Serum: 103 ng/mL (04-10-20 @ 04:30)      RECOMMENDATIONS  - Start Azithro 500mg PO x1, then 250mg PO daily x 4 days   - complete 5 days of plaquenil  - Recommend prone positioning if possible   - lovenox    Spectra [phone]

## 2020-04-12 NOTE — PROGRESS NOTE ADULT - SUBJECTIVE AND OBJECTIVE BOX
RUBIO, SLADE  35y, Female  Allergy: latex (Unknown)  No Known Drug Allergies  shellfish (Unknown)      LOS  2d    CHIEF COMPLAINT: AHRF + Viral Pneumonia Secondary to COVID-19 (10 Apr 2020 20:08)      INTERVAL EVENTS/HPI  - No acute events overnight  - T(F): , Max: 97.8 (04-12-20 @ 05:24)  - WBC Count: 7.29 (04-12-20 @ 10:06)  WBC Count: 8.52 (04-11-20 @ 07:21)  - Creatinine, Serum: 0.6 (04-12-20 @ 10:06)  Creatinine, Serum: 0.8 (04-11-20 @ 07:21)       ROS  General: Denies rigors, nightsweats  HEENT: Denies headache, rhinorrhea, sore throat, eye pain  CV: Denies CP, palpitations  PULM: Denies wheezing, hemoptysis  GI: Denies hematemesis, hematochezia, melena  : Denies discharge, hematuria  MSK: Denies arthralgias, myalgias  SKIN: Denies rash, lesions  NEURO: Denies paresthesias, weakness  PSYCH: Denies depression, anxiety    VITALS:  T(F): 97.8, Max: 97.8 (04-12-20 @ 05:24)  HR: 77  BP: 115/66  RR: 18Vital Signs Last 24 Hrs  T(C): 36.6 (12 Apr 2020 05:24), Max: 36.6 (12 Apr 2020 05:24)  T(F): 97.8 (12 Apr 2020 05:24), Max: 97.8 (12 Apr 2020 05:24)  HR: 77 (12 Apr 2020 05:24) (72 - 90)  BP: 115/66 (12 Apr 2020 05:24) (103/59 - 115/66)  BP(mean): --  RR: 18 (12 Apr 2020 05:24) (18 - 18)  SpO2: 97% (12 Apr 2020 09:00) (94% - 97%)    PHYSICAL EXAM:  Gen: on NC  HEENT: NCAT  Resp: b/l chest expansion  Neuro: nonfocal     FH: Non-contributory  Social Hx: Non-contributory    TESTS & MEASUREMENTS:                        12.7   7.29  )-----------( 317      ( 12 Apr 2020 10:06 )             39.5     04-12    136  |  98  |  8<L>  ----------------------------<  200<H>  3.9   |  23  |  0.6<L>    Ca    9.4      12 Apr 2020 10:06  Phos  3.3     04-12  Mg     2.3     04-12    TPro  7.8  /  Alb  4.0  /  TBili  0.4  /  DBili  x   /  AST  19  /  ALT  27  /  AlkPhos  61  04-12    eGFR if Non African American: 119 mL/min/1.73M2 (04-12-20 @ 10:06)  eGFR if African American: 138 mL/min/1.73M2 (04-12-20 @ 10:06)    LIVER FUNCTIONS - ( 12 Apr 2020 10:06 )  Alb: 4.0 g/dL / Pro: 7.8 g/dL / ALK PHOS: 61 U/L / ALT: 27 U/L / AST: 19 U/L / GGT: x               Culture - Blood (collected 04-10-20 @ 04:30)  Source: .Blood Blood  Preliminary Report (04-11-20 @ 18:01):    No growth to date.    Culture - Blood (collected 04-10-20 @ 04:30)  Source: .Blood Blood  Preliminary Report (04-11-20 @ 18:01):    No growth to date.    Culture - Urine (collected 04-10-20 @ 04:30)  Source: .Urine Clean Catch (Midstream)  Final Report (04-11-20 @ 17:39):    No growth    Culture - Urine (collected 04-06-20 @ 12:35)  Source: .Urine Clean Catch (Midstream)  Final Report (04-07-20 @ 16:26):    No growth        Lactate, Blood: 0.9 mmol/L (04-10-20 @ 04:30)      INFECTIOUS DISEASES TESTING  Procalcitonin, Serum: 0.07 (04-10-20 @ 04:30)      INFLAMMATORY MARKERS  C-Reactive Protein, Serum: 15.61 mg/dL (04-10-20 @ 04:30)      RADIOLOGY & ADDITIONAL TESTS:  I have personally reviewed the last available Chest xray  CXR      CT  CT Angio Chest w/ IV Cont:   EXAM:  CT ANGIO CHEST (W)AW IC            PROCEDURE DATE:  04/11/2020            INTERPRETATION:  CLINICAL STATEMENT: Pleuritic chest pain and worsening saturation.    TECHNIQUE: Multislice helical sections were obtained from the thoracic inlet to the lung bases during rapid administration of 80 mL Optiray 320 intravenous contrast using a CTA protocol. Thin sections were reconstructed through the pulmonary vasculature. Coronal, sagittal and MIP reformatted images are also submitted.    COMPARISON CT: CT abdomen pelvis 4/6/2020.    OTHER STUDIES USED FOR CORRELATION: None.      FINDINGS:    PULMONARY EMBOLUS: No central or segmental pulmonary embolus.    LUNGS, PLEURA, AIRWAYS: Diffuse bilateral patchy groundglass opacities. No pneumothorax or pleural effusion.    THORACIC NODES: No mediastinal, hilar, supraclavicular or axillary lymphadenopathy.    MEDIASTINUM/GREAT VESSELS: Normal heart size. No pericardial effusion. Main pulmonary artery measures 2.9 cm at the upper limit of normal (7/114).    VISUALIZED UPPER ABDOMEN: Calcified hepatic granuloma, otherwise the partially visualized upper abdomen shows no evidence acute pathology.    BONES/SOFT TISSUES: No acute osseous abnormality.      IMPRESSION:     No CT evidence of acute pulmonary embolus.    Diffuse bilateral patchy ground glass opacities, can be seen with atypical/viral pneumonia, with agents such as COVID-19. Of note when compared to the lower lung lobes from CT abdomen/pelvis done 4/6/2020, opacities seem to be increased.              SIOBHAN HERNÁNDEZ M.D., RESIDENT RADIOLOGIST  This document has been electronically signed.  JEANNETTE LOAIZA M.D., ATTENDING RADIOLOGIST  This document has been electronically signed. Apr 11 2020  6:26PM             (04-11-20 @ 17:46)      CARDIOLOGY TESTING  12 Lead ECG:   Ventricular Rate 124 BPM    Atrial Rate 124 BPM    P-R Interval 138 ms    QRS Duration 70 ms    Q-T Interval 284 ms    QTC Calculation(Bezet) 408 ms    P Axis 52 degrees    R Axis 66 degrees    T Axis 61 degrees    Diagnosis Line Sinus tachycardia  Otherwise normal ECG    Confirmed by LEE BREWSTER MD ([id number]) on 4/10/2020 6:58:17 AM (04-10-20 @ 04:49)      MEDICATIONS  ascorbic acid 1000  benzonatate 100  budesonide 160 MICROgram(s)/formoterol 4.5 MICROgram(s) Inhaler 2  enoxaparin Injectable 40  hydroxychloroquine   hydroxychloroquine 400  methylPREDNISolone sodium succinate Injectable 50  zinc sulfate 220      WEIGHT  Weight (kg): 98.9 (04-10-20 @ 04:48)  Creatinine, Serum: 0.6 mg/dL (04-12-20 @ 10:06)      ANTIBIOTICS:  hydroxychloroquine   Oral   hydroxychloroquine 400 milliGRAM(s) Oral every 24 hours      All available historical records have been reviewed

## 2020-04-12 NOTE — PROGRESS NOTE ADULT - SUBJECTIVE AND OBJECTIVE BOX
S: Breathing comfortably on NC now.  some cough, flank pain.      All other pertinent ROS negative.      Vital Signs Last 24 Hrs  T(C): 36.9 (12 Apr 2020 12:47), Max: 36.9 (12 Apr 2020 12:47)  T(F): 98.5 (12 Apr 2020 12:47), Max: 98.5 (12 Apr 2020 12:47)  HR: 92 (12 Apr 2020 12:47) (72 - 92)  BP: 126/65 (12 Apr 2020 12:47) (103/59 - 126/65)  BP(mean): --  RR: 18 (12 Apr 2020 12:47) (18 - 18)  SpO2: 97% (12 Apr 2020 09:00) (94% - 97%)  PHYSICAL EXAM:    Constitutional: NAD, awake and alert, well-developed  HEENT: PERR, EOMI, Normal Hearing, MMM  Neck: Soft and supple, No LAD, No JVD  Respiratory: Bibasal rhonchi  Cardiovascular: S1 and S2, regular rate and rhythm, no Murmurs, gallops or rubs  Gastrointestinal: Bowel Sounds present, soft, nontender, nondistended, no guarding, no rebound  Extremities: No peripheral edema      MEDICATIONS:  MEDICATIONS  (STANDING):  ascorbic acid 1000 milliGRAM(s) Oral daily  benzonatate 100 milliGRAM(s) Oral three times a day  budesonide 160 MICROgram(s)/formoterol 4.5 MICROgram(s) Inhaler 2 Puff(s) Inhalation two times a day  dextrose 5%. 1000 milliLiter(s) (50 mL/Hr) IV Continuous <Continuous>  dextrose 50% Injectable 12.5 Gram(s) IV Push once  dextrose 50% Injectable 25 Gram(s) IV Push once  dextrose 50% Injectable 25 Gram(s) IV Push once  enoxaparin Injectable 40 milliGRAM(s) SubCutaneous at bedtime  hydroxychloroquine   Oral   hydroxychloroquine 400 milliGRAM(s) Oral every 24 hours  insulin lispro (HumaLOG) corrective regimen sliding scale   SubCutaneous three times a day before meals  methylPREDNISolone sodium succinate Injectable 50 milliGRAM(s) IV Push two times a day  zinc sulfate 220 milliGRAM(s) Oral daily      LABS: All Labs Reviewed:                        12.7   7.29  )-----------( 317      ( 12 Apr 2020 10:06 )             39.5     04-12    136  |  98  |  8<L>  ----------------------------<  200<H>  3.9   |  23  |  0.6<L>    Ca    9.4      12 Apr 2020 10:06  Phos  3.3     04-12  Mg     2.3     04-12    TPro  7.8  /  Alb  4.0  /  TBili  0.4  /  DBili  x   /  AST  19  /  ALT  27  /  AlkPhos  61  04-12      CARDIAC MARKERS ( 11 Apr 2020 07:21 )  x     / <0.01 ng/mL / x     / x     / x          Blood Culture: 04-10 @ 04:30  Organism --  Gram Stain Blood -- Gram Stain --  Specimen Source .Urine Clean Catch (Midstream)  Culture-Blood --        Radiology: reviewed

## 2020-04-12 NOTE — PROGRESS NOTE ADULT - ASSESSMENT
36y/o F w/ PMH of asthma, PCOS, and obesity presenting to ED with CP and SOB. Symptoms Started 4/3. Gradually worsened now. increased upper abdominal/R flank pain, pleuritic Chest pain since 1 night PTA.     # Acute hypoxemic respi failure 2/2 Acute COVID-19 Viral PNA:  -Septic on admission: HR:127, Tmax: 102.7, RR: 27, Hypoxia, Lactate wnl  -CXR and CT:  Bibasal consolidations and interstitial opacities,  -Tested Covid positive outpatient   -On Admission, Desaturated to 75% on RA>>6L: 80's  Yesterday NRB --> To now 97% on 4L NC.    on hydroxychloroquine since (4/11), Baseline QTC: 408.  QTC monitoring   IV Solumedrol 50mg Q12 since 4/11. (given she is in inflammatory phase)    procalcitonin 0.07    - f/u CRP, ferritin    STAT CTAngio ruled out PE.     - Tylenol PRN for fevers, Tessalon for Cough  -Supportive care with  fluids, Vitamin C and Zinc   -Trend CBC and LFTs  - f/u ID consult    Acute Abdominal Pain: likely secondary to COVID-19 myalgias.     -CTA/P : non-revealing for abdominal pathology   -UA and UCx: Neg, was recently sent  again    -Tramadol for moderate pain (watch QTc), can add Oxycodone 10mg for severe pain   -F/up Renal/ bladder US and serum pregnancy test   Patient thinks she is not pregnant. Last delivery May.  Says LMP December but she has PCOS.   U preg negative.  Benefits of stat CTA outweigh risks.     H/O Asthma  -c/w Inhalers, no Nebulizers     H/O PCOS  -Takes Metformin at home     DVT PPX: Lovenox  GI PPX: not indicated  FULL CODE  Dispo: from home  -anticipate d/c to home vs. [place].

## 2020-04-13 LAB
ALBUMIN SERPL ELPH-MCNC: 3.9 G/DL — SIGNIFICANT CHANGE UP (ref 3.5–5.2)
ALP SERPL-CCNC: 62 U/L — SIGNIFICANT CHANGE UP (ref 30–115)
ALT FLD-CCNC: 31 U/L — SIGNIFICANT CHANGE UP (ref 0–41)
ANION GAP SERPL CALC-SCNC: 12 MMOL/L — SIGNIFICANT CHANGE UP (ref 7–14)
AST SERPL-CCNC: 15 U/L — SIGNIFICANT CHANGE UP (ref 0–41)
BASOPHILS # BLD AUTO: 0.02 K/UL — SIGNIFICANT CHANGE UP (ref 0–0.2)
BASOPHILS NFR BLD AUTO: 0.1 % — SIGNIFICANT CHANGE UP (ref 0–1)
BILIRUB SERPL-MCNC: 0.4 MG/DL — SIGNIFICANT CHANGE UP (ref 0.2–1.2)
BUN SERPL-MCNC: 13 MG/DL — SIGNIFICANT CHANGE UP (ref 10–20)
CALCIUM SERPL-MCNC: 9.4 MG/DL — SIGNIFICANT CHANGE UP (ref 8.5–10.1)
CHLORIDE SERPL-SCNC: 99 MMOL/L — SIGNIFICANT CHANGE UP (ref 98–110)
CO2 SERPL-SCNC: 25 MMOL/L — SIGNIFICANT CHANGE UP (ref 17–32)
CREAT SERPL-MCNC: 0.6 MG/DL — LOW (ref 0.7–1.5)
CRP SERPL-MCNC: 8.59 MG/DL — HIGH (ref 0–0.4)
EOSINOPHIL # BLD AUTO: 0.01 K/UL — SIGNIFICANT CHANGE UP (ref 0–0.7)
EOSINOPHIL NFR BLD AUTO: 0.1 % — SIGNIFICANT CHANGE UP (ref 0–8)
ESTIMATED AVERAGE GLUCOSE: 131 MG/DL — HIGH (ref 68–114)
FERRITIN SERPL-MCNC: 161 NG/ML — HIGH (ref 15–150)
GLUCOSE BLDC GLUCOMTR-MCNC: 123 MG/DL — HIGH (ref 70–99)
GLUCOSE BLDC GLUCOMTR-MCNC: 125 MG/DL — HIGH (ref 70–99)
GLUCOSE BLDC GLUCOMTR-MCNC: 143 MG/DL — HIGH (ref 70–99)
GLUCOSE BLDC GLUCOMTR-MCNC: 182 MG/DL — HIGH (ref 70–99)
GLUCOSE SERPL-MCNC: 156 MG/DL — HIGH (ref 70–99)
HBA1C BLD-MCNC: 6.2 % — HIGH (ref 4–5.6)
HCT VFR BLD CALC: 39.2 % — SIGNIFICANT CHANGE UP (ref 37–47)
HGB BLD-MCNC: 12.1 G/DL — SIGNIFICANT CHANGE UP (ref 12–16)
IMM GRANULOCYTES NFR BLD AUTO: 1.6 % — HIGH (ref 0.1–0.3)
LYMPHOCYTES # BLD AUTO: 1.13 K/UL — LOW (ref 1.2–3.4)
LYMPHOCYTES # BLD AUTO: 7.8 % — LOW (ref 20.5–51.1)
MCHC RBC-ENTMCNC: 25.9 PG — LOW (ref 27–31)
MCHC RBC-ENTMCNC: 30.9 G/DL — LOW (ref 32–37)
MCV RBC AUTO: 83.8 FL — SIGNIFICANT CHANGE UP (ref 81–99)
MONOCYTES # BLD AUTO: 0.35 K/UL — SIGNIFICANT CHANGE UP (ref 0.1–0.6)
MONOCYTES NFR BLD AUTO: 2.4 % — SIGNIFICANT CHANGE UP (ref 1.7–9.3)
NEUTROPHILS # BLD AUTO: 12.67 K/UL — HIGH (ref 1.4–6.5)
NEUTROPHILS NFR BLD AUTO: 88 % — HIGH (ref 42.2–75.2)
NRBC # BLD: 0 /100 WBCS — SIGNIFICANT CHANGE UP (ref 0–0)
PLATELET # BLD AUTO: 242 K/UL — SIGNIFICANT CHANGE UP (ref 130–400)
POTASSIUM SERPL-MCNC: 4.5 MMOL/L — SIGNIFICANT CHANGE UP (ref 3.5–5)
POTASSIUM SERPL-SCNC: 4.5 MMOL/L — SIGNIFICANT CHANGE UP (ref 3.5–5)
PROCALCITONIN SERPL-MCNC: 0.08 NG/ML — SIGNIFICANT CHANGE UP (ref 0.02–0.1)
PROT SERPL-MCNC: 7.3 G/DL — SIGNIFICANT CHANGE UP (ref 6–8)
RBC # BLD: 4.68 M/UL — SIGNIFICANT CHANGE UP (ref 4.2–5.4)
RBC # FLD: 13.2 % — SIGNIFICANT CHANGE UP (ref 11.5–14.5)
SODIUM SERPL-SCNC: 136 MMOL/L — SIGNIFICANT CHANGE UP (ref 135–146)
TROPONIN T SERPL-MCNC: <0.01 NG/ML — SIGNIFICANT CHANGE UP
TROPONIN T SERPL-MCNC: <0.01 NG/ML — SIGNIFICANT CHANGE UP
WBC # BLD: 14.41 K/UL — HIGH (ref 4.8–10.8)
WBC # FLD AUTO: 14.41 K/UL — HIGH (ref 4.8–10.8)

## 2020-04-13 PROCEDURE — 99233 SBSQ HOSP IP/OBS HIGH 50: CPT

## 2020-04-13 PROCEDURE — 93010 ELECTROCARDIOGRAM REPORT: CPT

## 2020-04-13 RX ORDER — GUAIFENESIN/DEXTROMETHORPHAN 600MG-30MG
10 TABLET, EXTENDED RELEASE 12 HR ORAL EVERY 8 HOURS
Refills: 0 | Status: DISCONTINUED | OUTPATIENT
Start: 2020-04-13 | End: 2020-04-15

## 2020-04-13 RX ORDER — PROCHLORPERAZINE MALEATE 5 MG
10 TABLET ORAL ONCE
Refills: 0 | Status: COMPLETED | OUTPATIENT
Start: 2020-04-13 | End: 2020-04-13

## 2020-04-13 RX ADMIN — ZINC SULFATE TAB 220 MG (50 MG ZINC EQUIVALENT) 220 MILLIGRAM(S): 220 (50 ZN) TAB at 11:14

## 2020-04-13 RX ADMIN — Medication 100 MILLIGRAM(S): at 17:28

## 2020-04-13 RX ADMIN — Medication 2: at 17:31

## 2020-04-13 RX ADMIN — Medication 10 MILLILITER(S): at 22:01

## 2020-04-13 RX ADMIN — TRAMADOL HYDROCHLORIDE 50 MILLIGRAM(S): 50 TABLET ORAL at 02:04

## 2020-04-13 RX ADMIN — ENOXAPARIN SODIUM 40 MILLIGRAM(S): 100 INJECTION SUBCUTANEOUS at 22:02

## 2020-04-13 RX ADMIN — Medication 10 MILLIGRAM(S): at 11:14

## 2020-04-13 RX ADMIN — Medication 100 MILLIGRAM(S): at 22:02

## 2020-04-13 RX ADMIN — BUDESONIDE AND FORMOTEROL FUMARATE DIHYDRATE 2 PUFF(S): 160; 4.5 AEROSOL RESPIRATORY (INHALATION) at 22:07

## 2020-04-13 RX ADMIN — Medication 10 MILLILITER(S): at 17:30

## 2020-04-13 RX ADMIN — Medication 50 MILLIGRAM(S): at 06:25

## 2020-04-13 RX ADMIN — Medication 1000 MILLIGRAM(S): at 11:14

## 2020-04-13 RX ADMIN — Medication 100 MILLIGRAM(S): at 06:25

## 2020-04-13 RX ADMIN — Medication 400 MILLIGRAM(S): at 19:10

## 2020-04-13 NOTE — PROGRESS NOTE ADULT - ASSESSMENT
34y/o F w/ PMH of asthma, PCOS, and obesity presenting to ED with CP and SOB. Symptoms Started 4/3. Gradually worsened now. increased upper abdominal/R flank pain, pleuritic Chest pain since 1 night PTA.     # Acute hypoxemic respi failure 2/2 Acute COVID-19 Viral PNA:  - Septic on admission: HR:127, Tmax: 102.7, RR: 27, Hypoxia, Lactate wnl  - fever, lymphopenia,   - CXR and CT:  Bibasal consolidations and interstitial opacities,  - CTA chest 4/11, no evidence of PE, shows GGOs  - Tested Covid positive outpatient   - procalcitonin 0.07, elevated CRP 15.61, ferritin 103 (WNL),  (elevated), D-Dimer 162 (WNL) - low indication of CRS  - monitor O2, titrate as needed  - EKG: Baseline QTC: 408.  QTC monitoring   - c/w hydroxychloroquine since (4/11), till 4/14  - c/w azithromycin till 4/16  - c/w IV Solumedrol 50mg Q12 since 4/11. (given she is in inflammatory phase)  - Tylenol PRN for fevers, Tessalon for Cough  - Supportive care with  fluids, Vitamin C and Zinc   - Trend CBC and LFTs  - f/u ID consult    # Acute Abdominal Pain: likely secondary to COVID-19 myalgias.   - CTA/P : non-revealing for abdominal pathology   - UA and UCx: Neg, was recently sent  again    - Tramadol for moderate pain (watch QTc), can add Oxycodone 10mg for severe pain   - F/up Renal/ bladder US and serum pregnancy test   - Patient thinks she is not pregnant. Last delivery May.  - Says LMP December but she has PCOS.   - U preg negative.  - Benefits of stat CTA outweigh risks.     # H/O Asthma  - c/w Inhalers, no Nebulizers     # H/O PCOS  - Takes Metformin at home     # Diet:   # DVT PPX: Lovenox  # GI PPX: not indicated  # ActivityL AAT  # Dispo: from home  -anticipate d/c to home vs. transfer to [place] tomorrow.   # FULL CODE 36y/o F w/ PMH of asthma, PCOS, and obesity presenting to ED with CP and SOB. Symptoms Started 4/3. Gradually worsened now. increased upper abdominal/R flank pain, pleuritic Chest pain since 1 night PTA.     # Acute hypoxemic respi failure 2/2 Acute COVID-19 Viral PNA:  - Septic on admission: HR:127, Tmax: 102.7, RR: 27, Hypoxia, Lactate wnl  - fever, lymphopenia,   - CXR and CT:  Bibasal consolidations and interstitial opacities,  - CTA chest 4/11, no evidence of PE, shows GGOs  - Tested Covid positive outpatient   - procalcitonin 0.07, elevated CRP 15.61, ferritin 103 (WNL),  (elevated), D-Dimer 162 (WNL) - low indication of CRS  - monitor O2, titrate as needed  - EKG: Baseline QTC: 408.  QTC monitoring   - c/w hydroxychloroquine since (4/11), till 4/14  - c/w azithromycin till 4/16  - c/w IV Solumedrol 50mg Qd since 4/11.   - Tylenol PRN for fevers, Tessalon for Cough  - Supportive care with  fluids, Vitamin C and Zinc   - Trend CBC and LFTs  - f/u ID consult    # Acute Abdominal Pain: likely secondary to COVID-19 myalgias.   - CTA/P : non-revealing for abdominal pathology   - UA and UCx: Neg, was recently sent  again    - Tramadol for moderate pain (watch QTc), can add Oxycodone 10mg for severe pain   - F/up Renal/ bladder US and serum pregnancy test   - Patient thinks she is not pregnant. Last delivery May.  - Says LMP December but she has PCOS.   - U preg negative.  - Benefits of stat CTA outweigh risks.     # H/O Asthma  - c/w Inhalers, no Nebulizers     # H/O PCOS  - Takes Metformin at home     # Diet: Regular  # DVT PPX: Lovenox  # GI PPX: not indicated  # ActivityL AAT  # Dispo: from home  -anticipate d/c to home vs. transfer to [place] tomorrow.   # FULL CODE

## 2020-04-13 NOTE — PROGRESS NOTE ADULT - SUBJECTIVE AND OBJECTIVE BOX
SUBJECTIVE:    Patient is a 35y old Female who presents with a chief complaint of AHRF + Viral Pneumonia Secondary to COVID-19 (12 Apr 2020 18:36)    Currently admitted to medicine with the primary diagnosis of Hypoxia     Today is hospital day 3d. This morning she is resting comfortably in bed and reports no new issues or overnight events.     PAST MEDICAL & SURGICAL HISTORY  Asthma  PCOS (polycystic ovarian syndrome)    SOCIAL HISTORY:  Negative for smoking/alcohol/drug use.     ALLERGIES:  latex (Unknown)  No Known Drug Allergies  shellfish (Unknown)    MEDICATIONS:  STANDING MEDICATIONS  ascorbic acid 1000 milliGRAM(s) Oral daily  azithromycin   Tablet 250 milliGRAM(s) Oral every 24 hours  benzonatate 100 milliGRAM(s) Oral three times a day  budesonide 160 MICROgram(s)/formoterol 4.5 MICROgram(s) Inhaler 2 Puff(s) Inhalation two times a day  dextrose 5%. 1000 milliLiter(s) IV Continuous <Continuous>  dextrose 50% Injectable 12.5 Gram(s) IV Push once  dextrose 50% Injectable 25 Gram(s) IV Push once  dextrose 50% Injectable 25 Gram(s) IV Push once  enoxaparin Injectable 40 milliGRAM(s) SubCutaneous at bedtime  guaifenesin/dextromethorphan  Syrup 10 milliLiter(s) Oral every 8 hours  hydroxychloroquine   Oral   hydroxychloroquine 400 milliGRAM(s) Oral every 24 hours  insulin lispro (HumaLOG) corrective regimen sliding scale   SubCutaneous three times a day before meals  methylPREDNISolone sodium succinate Injectable 50 milliGRAM(s) IV Push two times a day  prochlorperazine   Tablet 10 milliGRAM(s) Oral once  zinc sulfate 220 milliGRAM(s) Oral daily    PRN MEDICATIONS  acetaminophen   Tablet .. 650 milliGRAM(s) Oral every 6 hours PRN  ALBUTerol    90 MICROgram(s) HFA Inhaler 2 Puff(s) Inhalation every 6 hours PRN  dextrose 40% Gel 15 Gram(s) Oral once PRN  glucagon  Injectable 1 milliGRAM(s) IntraMuscular once PRN  traMADol 50 milliGRAM(s) Oral every 6 hours PRN    VITALS:   T(F): 96.2  HR: 68  BP: 108/54  RR: 18  SpO2: 94%    LABS:                        12.7   7.29  )-----------( 317      ( 12 Apr 2020 10:06 )             39.5     04-12    136  |  98  |  8<L>  ----------------------------<  200<H>  3.9   |  23  |  0.6<L>    Ca    9.4      12 Apr 2020 10:06  Phos  3.3     04-12  Mg     2.3     04-12    TPro  7.8  /  Alb  4.0  /  TBili  0.4  /  DBili  x   /  AST  19  /  ALT  27  /  AlkPhos  61  04-12  < from: 12 Lead ECG (04.10.20 @ 04:49) >  Ventricular Rate 124 BPM    Atrial Rate 124 BPM    P-R Interval 138 ms    QRS Duration 70 ms    Q-T Interval 284 ms    QTC Calculation(Bezet) 408 ms    P Axis 52 degrees    R Axis 66 degrees    T Axis 61 degrees    Diagnosis Line Sinus tachycardia  Otherwise normal ECG    < end of copied text >                      RADIOLOGY:  < from: CT Angio Chest w/ IV Cont (04.11.20 @ 17:46) >  IMPRESSION:     No CT evidence of acute pulmonary embolus.    Diffuse bilateral patchy ground glass opacities, can be seen with atypical/viral pneumonia, with agents such as COVID-19. Of note when compared to the lower lung lobes from CT abdomen/pelvis done 4/6/2020, opacities seem to be increased.    < end of copied text >    PHYSICAL EXAM:  Constitutional: NAD, awake and alert, well-developed  HEENT: PERR, EOMI, Normal Hearing, MMM  Neck: Soft and supple, No LAD, No JVD  Respiratory: Bibasal rhonchi  Cardiovascular: S1 and S2, regular rate and rhythm, no Murmurs, gallops or rubs  Gastrointestinal: Bowel Sounds present, soft, nontender, nondistended, no guarding, no rebound  Extremities: No peripheral edema

## 2020-04-14 LAB
ALBUMIN SERPL ELPH-MCNC: 3.4 G/DL — LOW (ref 3.5–5.2)
ALP SERPL-CCNC: 53 U/L — SIGNIFICANT CHANGE UP (ref 30–115)
ALT FLD-CCNC: 24 U/L — SIGNIFICANT CHANGE UP (ref 0–41)
ANION GAP SERPL CALC-SCNC: 12 MMOL/L — SIGNIFICANT CHANGE UP (ref 7–14)
AST SERPL-CCNC: 12 U/L — SIGNIFICANT CHANGE UP (ref 0–41)
BASOPHILS # BLD AUTO: 0.02 K/UL — SIGNIFICANT CHANGE UP (ref 0–0.2)
BASOPHILS NFR BLD AUTO: 0.2 % — SIGNIFICANT CHANGE UP (ref 0–1)
BILIRUB SERPL-MCNC: 0.4 MG/DL — SIGNIFICANT CHANGE UP (ref 0.2–1.2)
BUN SERPL-MCNC: 13 MG/DL — SIGNIFICANT CHANGE UP (ref 10–20)
CALCIUM SERPL-MCNC: 8.8 MG/DL — SIGNIFICANT CHANGE UP (ref 8.5–10.1)
CHLORIDE SERPL-SCNC: 100 MMOL/L — SIGNIFICANT CHANGE UP (ref 98–110)
CO2 SERPL-SCNC: 24 MMOL/L — SIGNIFICANT CHANGE UP (ref 17–32)
CREAT SERPL-MCNC: 0.7 MG/DL — SIGNIFICANT CHANGE UP (ref 0.7–1.5)
CULTURE RESULTS: SIGNIFICANT CHANGE UP
CULTURE RESULTS: SIGNIFICANT CHANGE UP
D DIMER BLD IA.RAPID-MCNC: 236 NG/ML DDU — HIGH (ref 0–230)
EOSINOPHIL # BLD AUTO: 0 K/UL — SIGNIFICANT CHANGE UP (ref 0–0.7)
EOSINOPHIL NFR BLD AUTO: 0 % — SIGNIFICANT CHANGE UP (ref 0–8)
GLUCOSE BLDC GLUCOMTR-MCNC: 105 MG/DL — HIGH (ref 70–99)
GLUCOSE BLDC GLUCOMTR-MCNC: 127 MG/DL — HIGH (ref 70–99)
GLUCOSE BLDC GLUCOMTR-MCNC: 139 MG/DL — HIGH (ref 70–99)
GLUCOSE BLDC GLUCOMTR-MCNC: 178 MG/DL — HIGH (ref 70–99)
GLUCOSE SERPL-MCNC: 111 MG/DL — HIGH (ref 70–99)
HCT VFR BLD CALC: 37.6 % — SIGNIFICANT CHANGE UP (ref 37–47)
HGB BLD-MCNC: 12 G/DL — SIGNIFICANT CHANGE UP (ref 12–16)
IMM GRANULOCYTES NFR BLD AUTO: 2.2 % — HIGH (ref 0.1–0.3)
LYMPHOCYTES # BLD AUTO: 1.85 K/UL — SIGNIFICANT CHANGE UP (ref 1.2–3.4)
LYMPHOCYTES # BLD AUTO: 17.2 % — LOW (ref 20.5–51.1)
MAGNESIUM SERPL-MCNC: 2.3 MG/DL — SIGNIFICANT CHANGE UP (ref 1.8–2.4)
MCHC RBC-ENTMCNC: 26.2 PG — LOW (ref 27–31)
MCHC RBC-ENTMCNC: 31.9 G/DL — LOW (ref 32–37)
MCV RBC AUTO: 82.1 FL — SIGNIFICANT CHANGE UP (ref 81–99)
MONOCYTES # BLD AUTO: 0.65 K/UL — HIGH (ref 0.1–0.6)
MONOCYTES NFR BLD AUTO: 6 % — SIGNIFICANT CHANGE UP (ref 1.7–9.3)
NEUTROPHILS # BLD AUTO: 8.01 K/UL — HIGH (ref 1.4–6.5)
NEUTROPHILS NFR BLD AUTO: 74.4 % — SIGNIFICANT CHANGE UP (ref 42.2–75.2)
NRBC # BLD: 0 /100 WBCS — SIGNIFICANT CHANGE UP (ref 0–0)
PLATELET # BLD AUTO: 343 K/UL — SIGNIFICANT CHANGE UP (ref 130–400)
POTASSIUM SERPL-MCNC: 4.2 MMOL/L — SIGNIFICANT CHANGE UP (ref 3.5–5)
POTASSIUM SERPL-SCNC: 4.2 MMOL/L — SIGNIFICANT CHANGE UP (ref 3.5–5)
PROT SERPL-MCNC: 6.4 G/DL — SIGNIFICANT CHANGE UP (ref 6–8)
RBC # BLD: 4.58 M/UL — SIGNIFICANT CHANGE UP (ref 4.2–5.4)
RBC # FLD: 13.1 % — SIGNIFICANT CHANGE UP (ref 11.5–14.5)
SODIUM SERPL-SCNC: 136 MMOL/L — SIGNIFICANT CHANGE UP (ref 135–146)
SPECIMEN SOURCE: SIGNIFICANT CHANGE UP
SPECIMEN SOURCE: SIGNIFICANT CHANGE UP
WBC # BLD: 10.77 K/UL — SIGNIFICANT CHANGE UP (ref 4.8–10.8)
WBC # FLD AUTO: 10.77 K/UL — SIGNIFICANT CHANGE UP (ref 4.8–10.8)

## 2020-04-14 PROCEDURE — 99233 SBSQ HOSP IP/OBS HIGH 50: CPT

## 2020-04-14 RX ORDER — PANTOPRAZOLE SODIUM 20 MG/1
40 TABLET, DELAYED RELEASE ORAL
Refills: 0 | Status: DISCONTINUED | OUTPATIENT
Start: 2020-04-14 | End: 2020-04-15

## 2020-04-14 RX ADMIN — Medication 400 MILLIGRAM(S): at 13:40

## 2020-04-14 RX ADMIN — Medication 100 MILLIGRAM(S): at 05:10

## 2020-04-14 RX ADMIN — BUDESONIDE AND FORMOTEROL FUMARATE DIHYDRATE 2 PUFF(S): 160; 4.5 AEROSOL RESPIRATORY (INHALATION) at 23:10

## 2020-04-14 RX ADMIN — Medication 10 MILLILITER(S): at 13:40

## 2020-04-14 RX ADMIN — PANTOPRAZOLE SODIUM 40 MILLIGRAM(S): 20 TABLET, DELAYED RELEASE ORAL at 06:31

## 2020-04-14 RX ADMIN — Medication 2: at 12:39

## 2020-04-14 RX ADMIN — BUDESONIDE AND FORMOTEROL FUMARATE DIHYDRATE 2 PUFF(S): 160; 4.5 AEROSOL RESPIRATORY (INHALATION) at 09:21

## 2020-04-14 RX ADMIN — Medication 1000 MILLIGRAM(S): at 12:38

## 2020-04-14 RX ADMIN — ENOXAPARIN SODIUM 40 MILLIGRAM(S): 100 INJECTION SUBCUTANEOUS at 23:12

## 2020-04-14 RX ADMIN — ZINC SULFATE TAB 220 MG (50 MG ZINC EQUIVALENT) 220 MILLIGRAM(S): 220 (50 ZN) TAB at 12:39

## 2020-04-14 RX ADMIN — Medication 650 MILLIGRAM(S): at 23:15

## 2020-04-14 RX ADMIN — Medication 10 MILLILITER(S): at 05:11

## 2020-04-14 RX ADMIN — Medication 100 MILLIGRAM(S): at 13:40

## 2020-04-14 RX ADMIN — Medication 10 MILLILITER(S): at 23:13

## 2020-04-14 RX ADMIN — Medication 50 MILLIGRAM(S): at 05:10

## 2020-04-14 RX ADMIN — Medication 40 MILLIGRAM(S): at 13:43

## 2020-04-14 RX ADMIN — TRAMADOL HYDROCHLORIDE 50 MILLIGRAM(S): 50 TABLET ORAL at 03:15

## 2020-04-14 RX ADMIN — Medication 100 MILLIGRAM(S): at 23:12

## 2020-04-14 NOTE — PROGRESS NOTE ADULT - ATTENDING COMMENTS
Patient seen and examined. Agree with above note.   patient is comfortable in room air now and not needing oxygen now. But she becomes dyspneic on exertion. She doesn't feel ready to go home as of today. Also her ride home will available tomorrow.    She is morbidly obese with asthma and PCOS - treated for COVID positive pneumonia.   Improved and expecting to d/c home tomorrow
34y/o F w/ PMH of asthma, PCOS, and obesity presenting to ED with CP and SOB. Symptoms Started 4/3. Gradually worsened now. increased upper abdominal/R flank pain, pleuritic Chest pain since 1 night PTA.     # Acute hypoxemic respi failure 2/2 Acute COVID-19 Viral PNA:  -Septic on admission: HR:127, Tmax: 102.7, RR: 27, Hypoxia, Lactate wnl  -CXR and CT:  Bibasal consolidations and interstitial opacities,  -Tested Covid positive outpatient     On admission NRB --> To now 94% on 4L NC.    on hydroxychloroquine since (4/11), (Day 4) Baseline QTC: 408.  QTC monitoring   IV Solumedrol 50mg Q12 since 4/11. (Day 3) (given she is in inflammatory phase)  On 4/13, decreasing to IV solumedrl 50mg QD. Sugars very high as a result of which patient was having transient blurry vision.  Azithro 4/12-4/13. But d/c now since patient finished Azithro course as outpatient prior to arrival and sats improving.  procalcitonin 0.07    - f/u CRP, ferritin    STAT CTAngio ruled out PE.     - Tylenol PRN for fevers, Tessalon for Cough  -Supportive care with  fluids, Vitamin C and Zinc   -Trend CBC and LFTs  - f/u ID consult    Acute Abdominal Pain: likely secondary to COVID-19 myalgias.     -CTA/P : non-revealing for abdominal pathology   -UA and UCx: Neg, was recently sent  again    -Tramadol for moderate pain (watch QTc), can add Oxycodone 10mg for severe pain   -F/up Renal/ bladder US and serum pregnancy test   Patient thinks she is not pregnant. Last delivery May.  Says LMP December but she has PCOS.   U preg negative.      H/O Asthma  -c/w Inhalers, no Nebulizers     H/O PCOS  -Takes Metformin at home     DVT PPX: Lovenox  GI PPX: not indicated  FULL CODE  Dispo: from home  -anticipate d/c to home vs. transfer to [place] tomorrow if sats improved.

## 2020-04-14 NOTE — PROGRESS NOTE ADULT - SUBJECTIVE AND OBJECTIVE BOX
SUBJECTIVE:    Patient is a 35y old Female who presents with a chief complaint of AHRF + Viral Pneumonia Secondary to COVID-19 (13 Apr 2020 07:35)    Currently admitted to medicine with the primary diagnosis of Hypoxia     Today is hospital day 4d. This morning she is resting comfortably in bed and reports no new issues or overnight events.     PAST MEDICAL & SURGICAL HISTORY  Asthma  PCOS (polycystic ovarian syndrome)    SOCIAL HISTORY:  Negative for smoking/alcohol/drug use.     ALLERGIES:  latex (Unknown)  No Known Drug Allergies  shellfish (Unknown)    MEDICATIONS:  STANDING MEDICATIONS  ascorbic acid 1000 milliGRAM(s) Oral daily  benzonatate 100 milliGRAM(s) Oral three times a day  budesonide 160 MICROgram(s)/formoterol 4.5 MICROgram(s) Inhaler 2 Puff(s) Inhalation two times a day  dextrose 5%. 1000 milliLiter(s) IV Continuous <Continuous>  dextrose 50% Injectable 12.5 Gram(s) IV Push once  dextrose 50% Injectable 25 Gram(s) IV Push once  dextrose 50% Injectable 25 Gram(s) IV Push once  enoxaparin Injectable 40 milliGRAM(s) SubCutaneous at bedtime  guaifenesin/dextromethorphan  Syrup 10 milliLiter(s) Oral every 8 hours  hydroxychloroquine   Oral   hydroxychloroquine 400 milliGRAM(s) Oral every 24 hours  insulin lispro (HumaLOG) corrective regimen sliding scale   SubCutaneous three times a day before meals  methylPREDNISolone sodium succinate Injectable 50 milliGRAM(s) IV Push daily  pantoprazole    Tablet 40 milliGRAM(s) Oral before breakfast  zinc sulfate 220 milliGRAM(s) Oral daily    PRN MEDICATIONS  acetaminophen   Tablet .. 650 milliGRAM(s) Oral every 6 hours PRN  ALBUTerol    90 MICROgram(s) HFA Inhaler 2 Puff(s) Inhalation every 6 hours PRN  dextrose 40% Gel 15 Gram(s) Oral once PRN  glucagon  Injectable 1 milliGRAM(s) IntraMuscular once PRN  traMADol 50 milliGRAM(s) Oral every 6 hours PRN    VITALS:   T(F): 96.5  HR: 55  BP: 113/62  RR: 18  SpO2: 99%    LABS:                        12.0   10.77 )-----------( x        ( 14 Apr 2020 07:17 )             37.6     04-13    136  |  99  |  13  ----------------------------<  156<H>  4.5   |  25  |  0.6<L>    Ca    9.4      13 Apr 2020 12:10  Phos  3.3     04-12  Mg     2.3     04-12    TPro  7.3  /  Alb  3.9  /  TBili  0.4  /  DBili  x   /  AST  15  /  ALT  31  /  AlkPhos  62  04-13          Troponin T, Serum: <0.01 ng/mL (04-13-20 @ 16:42)  Troponin T, Serum: <0.01 ng/mL (04-13-20 @ 12:10)      CARDIAC MARKERS ( 13 Apr 2020 16:42 )  x     / <0.01 ng/mL / x     / x     / x      CARDIAC MARKERS ( 13 Apr 2020 12:10 )  x     / <0.01 ng/mL / x     / x     / x      < from: 12 Lead ECG (04.13.20 @ 06:50) >  Ventricular Rate 69 BPM    Atrial Rate 69 BPM    P-R Interval 152 ms    QRS Duration 84 ms    Q-T Interval 396 ms    QTC Calculation(Bezet) 424 ms    P Axis 54 degrees    R Axis 75 degrees    T Axis 66 degrees    Diagnosis Line Normal sinus rhythm  Normal ECG    < end of copied text >        RADIOLOGY:    < from: CT Angio Chest w/ IV Cont (04.11.20 @ 17:46) >  IMPRESSION:     No CT evidence of acute pulmonary embolus.    Diffuse bilateral patchy ground glass opacities, can be seen with atypical/viral pneumonia, with agents such as COVID-19. Of note when compared to the lower lung lobes from CT abdomen/pelvis done 4/6/2020, opacities seem to be increased.    < end of copied text >  < from: Xray Chest 1 View-PORTABLE IMMEDIATE (04.10.20 @ 07:37) >  Impression:      Patchy bibasilar airspace opacities, grossly similar to prior.     < end of copied text >      PHYSICAL EXAM:  Constitutional: NAD, awake and alert, well-developed  HEENT: PERR, EOMI, Normal Hearing, MMM  Neck: Soft and supple, No LAD, No JVD  Respiratory: Bibasal rhonchi  Cardiovascular: S1 and S2, regular rate and rhythm, no Murmurs, gallops or rubs  Gastrointestinal: Bowel Sounds present, soft, nontender, nondistended, no guarding, no rebound  Extremities: No peripheral edema

## 2020-04-14 NOTE — PROGRESS NOTE ADULT - ASSESSMENT
36y/o F w/ PMH of asthma, PCOS, and obesity presenting to ED with CP and SOB. Symptoms Started 4/3. Gradually worsened now. increased upper abdominal/R flank pain, pleuritic Chest pain since 1 night PTA.     # Acute hypoxemic respi failure 2/2 Acute COVID-19 Viral PNA:  - Septic on admission: HR:127, Tmax: 102.7, RR: 27, Hypoxia, Lactate wnl  - fever, lymphopenia,   - CXR and CT:  Bibasal consolidations and interstitial opacities,  - CTA chest 4/11, no evidence of PE, shows GGOs  - Tested Covid positive outpatient   - procalcitonin 0.07, elevated CRP 15.61, ferritin 103 (WNL),  (elevated), D-Dimer 162 (WNL) - low indication of CRS  - monitor O2, titrate as needed  - EKG: Baseline QTC: 408.  QTC monitoring   - c/w hydroxychloroquine since (4/11), till 4/14  - c/w azithromycin till 4/16  - c/w IV Solumedrol 50mg Qd since 4/11.   - Tylenol PRN for fevers, Tessalon for Cough  - Supportive care with  fluids, Vitamin C and Zinc   - Trend CBC and LFTs  - f/u ID consult    # Acute Abdominal Pain: likely secondary to COVID-19 myalgias.   - CTA/P : non-revealing for abdominal pathology   - UA and UCx: Neg, was recently sent  again    - Tramadol for moderate pain (watch QTc), can add Oxycodone 10mg for severe pain   - F/up Renal/ bladder US and serum pregnancy test   - Patient thinks she is not pregnant. Last delivery May.  - Says LMP December but she has PCOS.   - U preg negative.  - Benefits of stat CTA outweigh risks.     # H/O Asthma  - c/w Inhalers, no Nebulizers     # H/O PCOS  - Takes Metformin at home     # Diet: Regular  # DVT PPX: Lovenox  # GI PPX: not indicated  # ActivityL AAT  # Dispo: from home  -anticipate d/c to home vs. transfer to [place] tomorrow.   # FULL CODE

## 2020-04-15 ENCOUNTER — TRANSCRIPTION ENCOUNTER (OUTPATIENT)
Age: 35
End: 2020-04-15

## 2020-04-15 VITALS
DIASTOLIC BLOOD PRESSURE: 65 MMHG | HEART RATE: 68 BPM | TEMPERATURE: 96 F | OXYGEN SATURATION: 97 % | SYSTOLIC BLOOD PRESSURE: 103 MMHG

## 2020-04-15 LAB
ALBUMIN SERPL ELPH-MCNC: 3.9 G/DL — SIGNIFICANT CHANGE UP (ref 3.5–5.2)
ALP SERPL-CCNC: 62 U/L — SIGNIFICANT CHANGE UP (ref 30–115)
ALT FLD-CCNC: 23 U/L — SIGNIFICANT CHANGE UP (ref 0–41)
ANION GAP SERPL CALC-SCNC: 13 MMOL/L — SIGNIFICANT CHANGE UP (ref 7–14)
AST SERPL-CCNC: 13 U/L — SIGNIFICANT CHANGE UP (ref 0–41)
BASOPHILS # BLD AUTO: 0.05 K/UL — SIGNIFICANT CHANGE UP (ref 0–0.2)
BASOPHILS NFR BLD AUTO: 0.4 % — SIGNIFICANT CHANGE UP (ref 0–1)
BILIRUB SERPL-MCNC: 0.6 MG/DL — SIGNIFICANT CHANGE UP (ref 0.2–1.2)
BUN SERPL-MCNC: 13 MG/DL — SIGNIFICANT CHANGE UP (ref 10–20)
CALCIUM SERPL-MCNC: 9.6 MG/DL — SIGNIFICANT CHANGE UP (ref 8.5–10.1)
CHLORIDE SERPL-SCNC: 99 MMOL/L — SIGNIFICANT CHANGE UP (ref 98–110)
CO2 SERPL-SCNC: 27 MMOL/L — SIGNIFICANT CHANGE UP (ref 17–32)
CREAT SERPL-MCNC: 0.8 MG/DL — SIGNIFICANT CHANGE UP (ref 0.7–1.5)
EOSINOPHIL # BLD AUTO: 0 K/UL — SIGNIFICANT CHANGE UP (ref 0–0.7)
EOSINOPHIL NFR BLD AUTO: 0 % — SIGNIFICANT CHANGE UP (ref 0–8)
GLUCOSE BLDC GLUCOMTR-MCNC: 102 MG/DL — HIGH (ref 70–99)
GLUCOSE BLDC GLUCOMTR-MCNC: 105 MG/DL — HIGH (ref 70–99)
GLUCOSE SERPL-MCNC: 109 MG/DL — HIGH (ref 70–99)
HCT VFR BLD CALC: 41.6 % — SIGNIFICANT CHANGE UP (ref 37–47)
HGB BLD-MCNC: 13.3 G/DL — SIGNIFICANT CHANGE UP (ref 12–16)
IMM GRANULOCYTES NFR BLD AUTO: 3.8 % — HIGH (ref 0.1–0.3)
LYMPHOCYTES # BLD AUTO: 17.3 % — LOW (ref 20.5–51.1)
LYMPHOCYTES # BLD AUTO: 2.34 K/UL — SIGNIFICANT CHANGE UP (ref 1.2–3.4)
MAGNESIUM SERPL-MCNC: 2.5 MG/DL — HIGH (ref 1.8–2.4)
MCHC RBC-ENTMCNC: 26.4 PG — LOW (ref 27–31)
MCHC RBC-ENTMCNC: 32 G/DL — SIGNIFICANT CHANGE UP (ref 32–37)
MCV RBC AUTO: 82.7 FL — SIGNIFICANT CHANGE UP (ref 81–99)
MONOCYTES # BLD AUTO: 0.77 K/UL — HIGH (ref 0.1–0.6)
MONOCYTES NFR BLD AUTO: 5.7 % — SIGNIFICANT CHANGE UP (ref 1.7–9.3)
NEUTROPHILS # BLD AUTO: 9.88 K/UL — HIGH (ref 1.4–6.5)
NEUTROPHILS NFR BLD AUTO: 72.8 % — SIGNIFICANT CHANGE UP (ref 42.2–75.2)
NRBC # BLD: 0 /100 WBCS — SIGNIFICANT CHANGE UP (ref 0–0)
PLATELET # BLD AUTO: 417 K/UL — HIGH (ref 130–400)
POTASSIUM SERPL-MCNC: 4.6 MMOL/L — SIGNIFICANT CHANGE UP (ref 3.5–5)
POTASSIUM SERPL-SCNC: 4.6 MMOL/L — SIGNIFICANT CHANGE UP (ref 3.5–5)
PROT SERPL-MCNC: 7.3 G/DL — SIGNIFICANT CHANGE UP (ref 6–8)
RBC # BLD: 5.03 M/UL — SIGNIFICANT CHANGE UP (ref 4.2–5.4)
RBC # FLD: 13.2 % — SIGNIFICANT CHANGE UP (ref 11.5–14.5)
SODIUM SERPL-SCNC: 139 MMOL/L — SIGNIFICANT CHANGE UP (ref 135–146)
WBC # BLD: 13.55 K/UL — HIGH (ref 4.8–10.8)
WBC # FLD AUTO: 13.55 K/UL — HIGH (ref 4.8–10.8)

## 2020-04-15 PROCEDURE — 99239 HOSP IP/OBS DSCHRG MGMT >30: CPT

## 2020-04-15 RX ORDER — ZINC SULFATE TAB 220 MG (50 MG ZINC EQUIVALENT) 220 (50 ZN) MG
1 TAB ORAL
Qty: 7 | Refills: 0
Start: 2020-04-15 | End: 2020-04-21

## 2020-04-15 RX ORDER — ASCORBIC ACID 60 MG
1 TABLET,CHEWABLE ORAL
Qty: 7 | Refills: 0
Start: 2020-04-15 | End: 2020-04-21

## 2020-04-15 RX ORDER — NYSTATIN 500MM UNIT
5 POWDER (EA) MISCELLANEOUS
Qty: 105 | Refills: 0
Start: 2020-04-15 | End: 2020-04-21

## 2020-04-15 RX ORDER — DIPHENHYDRAMINE HYDROCHLORIDE AND LIDOCAINE HYDROCHLORIDE AND ALUMINUM HYDROXIDE AND MAGNESIUM HYDRO
30 KIT EVERY 6 HOURS
Refills: 0 | Status: DISCONTINUED | OUTPATIENT
Start: 2020-04-15 | End: 2020-04-15

## 2020-04-15 RX ORDER — DIPHENHYDRAMINE HYDROCHLORIDE AND LIDOCAINE HYDROCHLORIDE AND ALUMINUM HYDROXIDE AND MAGNESIUM HYDRO
30 KIT
Qty: 840 | Refills: 0
Start: 2020-04-15 | End: 2020-04-21

## 2020-04-15 RX ADMIN — Medication 40 MILLIGRAM(S): at 05:16

## 2020-04-15 RX ADMIN — PANTOPRAZOLE SODIUM 40 MILLIGRAM(S): 20 TABLET, DELAYED RELEASE ORAL at 05:16

## 2020-04-15 RX ADMIN — ZINC SULFATE TAB 220 MG (50 MG ZINC EQUIVALENT) 220 MILLIGRAM(S): 220 (50 ZN) TAB at 11:58

## 2020-04-15 RX ADMIN — Medication 100 MILLIGRAM(S): at 05:14

## 2020-04-15 RX ADMIN — Medication 1000 MILLIGRAM(S): at 11:59

## 2020-04-15 RX ADMIN — Medication 10 MILLILITER(S): at 11:59

## 2020-04-15 RX ADMIN — DIPHENHYDRAMINE HYDROCHLORIDE AND LIDOCAINE HYDROCHLORIDE AND ALUMINUM HYDROXIDE AND MAGNESIUM HYDRO 30 MILLILITER(S): KIT at 15:10

## 2020-04-15 RX ADMIN — Medication 10 MILLILITER(S): at 05:15

## 2020-04-15 RX ADMIN — BUDESONIDE AND FORMOTEROL FUMARATE DIHYDRATE 2 PUFF(S): 160; 4.5 AEROSOL RESPIRATORY (INHALATION) at 08:03

## 2020-04-15 RX ADMIN — Medication 100 MILLIGRAM(S): at 11:59

## 2020-04-15 NOTE — DISCHARGE NOTE PROVIDER - NSDCCPCAREPLAN_GEN_ALL_CORE_FT
PRINCIPAL DISCHARGE DIAGNOSIS  Diagnosis: COVID-19 virus infection  Assessment and Plan of Treatment: You have tested POSITIVE for the novel coronavirus (COVID-19). Upon discharge, you must self-quarantine for 14 days, or until your symptoms resolve, whichever is greater. Please wear a face mask if you are around other individuals. Try to avoid contact with house members, family, and friends for the duration of this quarantine. Please follow up with your primary care physician within 2-3 weeks of your discharge from [facility]. Please take all medications as prescribed. If you experience any worsening or recurrence of your symptoms, particularly worsening or high fever, shortness of breathe, extreme fatigue, or bloody cough please call 9-1-1 immediately or report to the nearest Emergency Department. If you have any questions or concerns, please do not hesitate to call the hospital at [phone].      SECONDARY DISCHARGE DIAGNOSES  Diagnosis: Viral sepsis  Assessment and Plan of Treatment: You had sepsis 2/2 to COVID viral PNA. You were treated with medications, IVF and your sepsis resolved. Please follow up with your PCP in 2-3 weeks for further management.    Diagnosis: Morbid obesity  Assessment and Plan of Treatment: You were found to have a BMI of 37. This is considered morbid obesity. Please excersise at least 30 minutes daily, Limit your intake of foods high in fat, salt and sugar. Do not eat processed food. Follow up with your PCP in 2-3 weeks for further management and weight loss options.

## 2020-04-15 NOTE — DISCHARGE NOTE PROVIDER - NSDCFUADDINST_GEN_ALL_CORE_FT
You have tested POSITIVE for the novel coronavirus (COVID-19). Upon discharge, you must self-quarantine for 14 days, or until your symptoms resolve, whichever is greater. Please wear a face mask if you are around other individuals. Try to avoid contact with house members, family, and friends for the duration of this quarantine. Please follow up with your primary care physician within 2-3 weeks of your discharge from [facility]. Please take all medications as prescribed. If you experience any worsening or recurrence of your symptoms, particularly worsening or high fever, shortness of breathe, extreme fatigue, or bloody cough please call 9-1-1 immediately or report to the nearest Emergency Department. If you have any questions or concerns, please do not hesitate to call the hospital at [phone].

## 2020-04-15 NOTE — DISCHARGE NOTE PROVIDER - HOSPITAL COURSE
34 y/o F w/ PMH of asthma, PCOS, pyelonephritis, and obesity presenting to ED with CP and SOB since last night and increased upper abdominal/R flank pain. In ED: O2 sat 75% pn RA, mid 80s on 6L NC improved to 90s on NRB. Sepsis POA, Tmax of 102.7, RR 27, . CXR showed b/l opacities, CTA chest shows diffuse b/l GGO. During hospitalization s/p plaquenil, azithromycin and solumedrol. Breathing improved and patient titrated off oxygen. Patient discharged to home.

## 2020-04-15 NOTE — DISCHARGE NOTE NURSING/CASE MANAGEMENT/SOCIAL WORK - NSTRANSFERBELONGINGSRESP_GEN_A_NUR
Consent: Written consent was obtained and risks were reviewed including but not limited to scarring, infection, bleeding, scabbing, incomplete removal, nerve damage and allergy to anesthesia. yes

## 2020-04-15 NOTE — PROGRESS NOTE ADULT - REASON FOR ADMISSION
AHRF + Viral Pneumonia Secondary to COVID-19

## 2020-04-15 NOTE — DISCHARGE NOTE NURSING/CASE MANAGEMENT/SOCIAL WORK - PATIENT PORTAL LINK FT
You can access the FollowMyHealth Patient Portal offered by [facility] by registering at the following website: [email or web address]. By joining [employer]’s FollowMyHealth portal, you will also be able to view your health information using other applications (apps) compatible with our system.

## 2020-04-15 NOTE — PROGRESS NOTE ADULT - SUBJECTIVE AND OBJECTIVE BOX
SLADE BERGERON  [facility] T1-3A 009 A ([facility] T1-3A)            Patient was evaluated and examined  by bedside, c/o mild sore throat, no fever, no hypoxia, still gets mild dyspnea during ambulation.        REVIEW OF SYSTEMS:  please see pertinent positives mentioned above, all other 12 ROS negative      T(C): , Max: 35.9 (04-15-20 @ 06:25)  HR: 68 (04-15-20 @ 13:01)  BP: 103/65 (04-15-20 @ 13:01)  RR: 17 (04-15-20 @ 06:25)  SpO2: 97% (04-15-20 @ 13:01)  CAPILLARY BLOOD GLUCOSE      POCT Blood Glucose.: 105 mg/dL (15 Apr 2020 11:30)  POCT Blood Glucose.: 102 mg/dL (15 Apr 2020 08:00)  POCT Blood Glucose.: 127 mg/dL (14 Apr 2020 22:13)  POCT Blood Glucose.: 139 mg/dL (14 Apr 2020 16:53)      PHYSICAL EXAM:  General: NAD, AAOX3, patient is laying comfortably in bed  HEENT: AT, NC, Supple, NO JVD, NO CB  Lungs: good breath sounds  B/L, no wheezing, no rhonchi  CVS: normal S1, S2, RRR, NO M/G/R  Abdomen: soft, bowel sounds present, non-tender, non-distended  Extremities: no edema, no clubbing, no cyanosis, positive peripheral pulses b/l  Neuro: no acute focal neurological deficits  Skin: no rash, no ecchymosis      LAB  CBC  Date: 04-15-20 @ 07:36  Mean cell Wlizvvacjo76.4  Mean cell Hemoglobin Conc32.0  Mean cell Volum 82.7  Platelet count-Automate 417  RBC Count 5.03  Red Cell Distrib Width13.2  WBC Count13.55  % Albumin, Urine--  Hematocrit 41.6  Hemoglobin 13.3  CBC  Date: 04-14-20 @ 07:17  Mean cell Zimzqkpdhx28.2  Mean cell Hemoglobin Conc31.9  Mean cell Volum 82.1  Platelet count-Automate 343  RBC Count 4.58  Red Cell Distrib Width13.1  WBC Count10.77  % Albumin, Urine--  Hematocrit 37.6  Hemoglobin 12.0  CBC  Date: 04-13-20 @ 12:10  Mean cell Ftvtzkhzty17.9  Mean cell Hemoglobin Conc30.9  Mean cell Volum 83.8  Platelet count-Automate 242  RBC Count 4.68  Red Cell Distrib Width13.2  WBC Count14.41  % Albumin, Urine--  Hematocrit 39.2  Hemoglobin 12.1  CBC  Date: 04-12-20 @ 10:06  Mean cell Zrfmxqkzov35.5  Mean cell Hemoglobin Conc32.2  Mean cell Volum 82.3  Platelet count-Automate 317  RBC Count 4.80  Red Cell Distrib Width13.1  WBC Count7.29  % Albumin, Urine--  Hematocrit 39.5  Hemoglobin 12.7  CBC  Date: 04-11-20 @ 07:21  Mean cell Bxvmfhfxvi31.0  Mean cell Hemoglobin Conc32.4  Mean cell Volum 83.4  Platelet count-Automate 240  RBC Count 4.52  Red Cell Distrib Width13.3  WBC Count8.52  % Albumin, Urine--  Hematocrit 37.7  Hemoglobin 12.2  CBC  Date: 04-10-20 @ 04:30  Mean cell Wnkeimdjkz29.2  Mean cell Hemoglobin Conc32.3  Mean cell Volum 81.3  Platelet count-Automate 262  RBC Count 5.07  Red Cell Distrib Width13.3  WBC Count8.93  % Albumin, Urine--  Hematocrit 41.2  Hemoglobin 13.3    [facility]  04-15-20 @ 07:36  Blood Gas Arterial-Calcium,Ionized--  Blood Urea Nitrogen, Serum 13 mg/dL [10 - 20]  Carbon Dioxide, Serum27 mmol/L [17 - 32]  Chloride, Serum99 mmol/L [98 - 110]  Creatinie, Serum0.8 mg/dL [0.7 - 1.5]  Glucose, Nqdav551 mg/dL<H> [70 - 99]  Potassium, Serum4.6 mmol/L [3.5 - 5.0]  Sodium, Serum 139 mmol/L [135 - 146]  [facility]  04-14-20 @ 07:17  Blood Gas Arterial-Calcium,Ionized--  Blood Urea Nitrogen, Serum 13 mg/dL [10 - 20]  Carbon Dioxide, Serum24 mmol/L [17 - 32]  Chloride, Byvpg912 mmol/L [98 - 110]  Creatinie, Serum0.7 mg/dL [0.7 - 1.5]  Glucose, Wgbde834 mg/dL<H> [70 - 99]  Potassium, Serum4.2 mmol/L [3.5 - 5.0]  Sodium, Serum 136 mmol/L [135 - 146]  [facility]  04-13-20 @ 12:10  Blood Gas Arterial-Calcium,Ionized--  Blood Urea Nitrogen, Serum 13 mg/dL [10 - 20]  Carbon Dioxide, Serum25 mmol/L [17 - 32]  Chloride, Serum99 mmol/L [98 - 110]  Creatinie, Serum0.6 mg/dL<L> [0.7 - 1.5]  Glucose, Tscqb146 mg/dL<H> [70 - 99]  Potassium, Serum4.5 mmol/L [3.5 - 5.0]  Sodium, Serum 136 mmol/L [135 - 146]  [facility]  04-12-20 @ 10:06  Blood Gas Arterial-Calcium,Ionized--  Blood Urea Nitrogen, Serum 8 mg/dL<L> [10 - 20]  Carbon Dioxide, Serum23 mmol/L [17 - 32]  Chloride, Serum98 mmol/L [98 - 110]  Creatinie, Serum0.6 mg/dL<L> [0.7 - 1.5]  Glucose, Kwdku114 mg/dL<H> [70 - 99]  Potassium, Serum3.9 mmol/L [3.5 - 5.0]  Sodium, Serum 136 mmol/L [135 - 146]  [facility]  04-11-20 @ 07:21  Blood Gas Arterial-Calcium,Ionized--  Blood Urea Nitrogen, Serum 7 mg/dL<L> [10 - 20]  Carbon Dioxide, Serum25 mmol/L [17 - 32]  Chloride, Yktek105 mmol/L [98 - 110]  Creatinie, Serum0.8 mg/dL [0.7 - 1.5]  Glucose, Hgppq118 mg/dL<H> [70 - 99]  Potassium, Serum4.2 mmol/L [3.5 - 5.0]  Sodium, Serum 139 mmol/L [135 - 146]              Microbiology:    Culture - Blood (collected 04-10-20 @ 04:30)  Source: .Blood Blood  Final Report (04-14-20 @ 18:00):    No Growth Final    Culture - Blood (collected 04-10-20 @ 04:30)  Source: .Blood Blood  Final Report (04-14-20 @ 18:00):    No Growth Final    Culture - Urine (collected 04-10-20 @ 04:30)  Source: .Urine Clean Catch (Midstream)  Final Report (04-11-20 @ 17:39):    No growth    Culture - Urine (collected 04-06-20 @ 12:35)  Source: .Urine Clean Catch (Midstream)  Final Report (04-07-20 @ 16:26):    No growth        Medications:  acetaminophen   Tablet .. 650 milliGRAM(s) Oral every 6 hours PRN  ALBUTerol    90 MICROgram(s) HFA Inhaler 2 Puff(s) Inhalation every 6 hours PRN  ascorbic acid 1000 milliGRAM(s) Oral daily  benzonatate 100 milliGRAM(s) Oral three times a day  budesonide 160 MICROgram(s)/formoterol 4.5 MICROgram(s) Inhaler 2 Puff(s) Inhalation two times a day  dextrose 40% Gel 15 Gram(s) Oral once PRN  dextrose 5%. 1000 milliLiter(s) IV Continuous <Continuous>  dextrose 50% Injectable 12.5 Gram(s) IV Push once  dextrose 50% Injectable 25 Gram(s) IV Push once  dextrose 50% Injectable 25 Gram(s) IV Push once  enoxaparin Injectable 40 milliGRAM(s) SubCutaneous at bedtime  FIRST- Mouthwash  BLM 30 milliLiter(s) Swish and Swallow every 6 hours  glucagon  Injectable 1 milliGRAM(s) IntraMuscular once PRN  guaifenesin/dextromethorphan  Syrup 10 milliLiter(s) Oral every 8 hours  insulin lispro (HumaLOG) corrective regimen sliding scale   SubCutaneous three times a day before meals  pantoprazole    Tablet 40 milliGRAM(s) Oral before breakfast  predniSONE   Tablet 40 milliGRAM(s) Oral daily  traMADol 50 milliGRAM(s) Oral every 6 hours PRN  zinc sulfate 220 milliGRAM(s) Oral daily        Assessment and Plan:  34y/o F w/ PMH of asthma, PCOS, and obesity presenting to ED with CP and SOB. Symptoms Started 4/3. Gradually worsened now. increased upper abdominal/R flank pain, pleuritic Chest pain since 1 night PTA.     # Acute hypoxemic respi failure 2/2 Acute COVID-19 Viral PNA:  - Septic on admission: HR:127, Tmax: 102.7, RR: 27, Hypoxia, Lactate wnl  - fever, lymphopenia,   - CXR and CT:  Bibasal consolidations and interstitial opacities,  - CTA chest 4/11, no evidence of PE, shows GGOs  - Tested Covid positive outpatient   - procalcitonin 0.07, elevated CRP 15.61, ferritin 103 (WNL),  (elevated), D-Dimer 162 (WNL) - low indication of CRS  - hypoxia has resolved.  - patient has completed  hydroxychloroquine.  - c/w azithromycin till 4/16  - c/w IV Solumedrol 50mg Qd since 4/11- upon d/c home will be switched PO prednisone 40 mg po once daily to complete 5 more days   - Tylenol PRN for fevers, Tessalon for Cough  - Supportive care with  fluids, Vitamin C and Zinc       # Acute Abdominal Pain: likely secondary to COVID-19 myalgias.   -resolved now.     # H/O Asthma  - c/w Inhalers, no Nebulizers     # H/O PCOS  - Takes Metformin at home     #Progress Note Handoff: Patient was optimized medically stable for d/c home today   Family discussion:d/c plan was d/w pt. by bedside Disposition: Home___today

## 2020-04-15 NOTE — DISCHARGE NOTE PROVIDER - CARE PROVIDER_API CALL
Karis Vargas)  Internal Medicine  [address] [place] [address]  Phone: [phone]  Fax: [phone]  Follow Up Time:

## 2020-04-15 NOTE — CHART NOTE - NSCHARTNOTEFT_GEN_A_CORE
Patient's , Nicholas Weinstein, [phone], is informed of patient's discharge.   He is educated regarding recommendations of patient's self isolation upon discharge.

## 2020-04-15 NOTE — DISCHARGE NOTE PROVIDER - NSDCMRMEDTOKEN_GEN_ALL_CORE_FT
albuterol 90 mcg/inh inhalation aerosol: 2 puff(s) inhaled every 6 hours  DuoNeb 0.5 mg-2.5 mg/3 mL inhalation solution: 3 milliliter(s) inhaled 4 times a day, As Needed  metFORMIN 500 mg oral tablet: 1 tab(s) orally 2 times a day albuterol 90 mcg/inh inhalation aerosol: 2 puff(s) inhaled every 6 hours  ascorbic acid 1000 mg oral tablet: 1 tab(s) orally once a day  benzonatate 100 mg oral capsule: 1 cap(s) orally 3 times a day, As Needed   diphenhydramine/lidocaine/aluminum hydroxide/magnesium hydroxide/simethicone mucous membrane suspension: 30 milliliter(s) mucous membrane every 6 hours   DuoNeb 0.5 mg-2.5 mg/3 mL inhalation solution: 3 milliliter(s) inhaled 4 times a day, As Needed  metFORMIN 500 mg oral tablet: 1 tab(s) orally 2 times a day  predniSONE 20 mg oral tablet: 2 tab(s) orally once a day  zinc sulfate 220 mg oral capsule: 1 cap(s) orally once a day

## 2020-04-22 DIAGNOSIS — U07.1 COVID-19: ICD-10-CM

## 2020-04-22 DIAGNOSIS — J12.89 OTHER VIRAL PNEUMONIA: ICD-10-CM

## 2020-04-22 DIAGNOSIS — E28.2 POLYCYSTIC OVARIAN SYNDROME: ICD-10-CM

## 2020-04-22 DIAGNOSIS — Z91.040 LATEX ALLERGY STATUS: ICD-10-CM

## 2020-04-22 DIAGNOSIS — J45.909 UNSPECIFIED ASTHMA, UNCOMPLICATED: ICD-10-CM

## 2020-04-22 DIAGNOSIS — J96.01 ACUTE RESPIRATORY FAILURE WITH HYPOXIA: ICD-10-CM

## 2020-04-22 DIAGNOSIS — E66.01 MORBID (SEVERE) OBESITY DUE TO EXCESS CALORIES: ICD-10-CM

## 2020-04-22 DIAGNOSIS — A41.89 OTHER SPECIFIED SEPSIS: ICD-10-CM

## 2020-04-22 DIAGNOSIS — Z79.84 LONG TERM (CURRENT) USE OF ORAL HYPOGLYCEMIC DRUGS: ICD-10-CM

## 2020-04-22 DIAGNOSIS — R35.0 FREQUENCY OF MICTURITION: ICD-10-CM

## 2020-05-01 ENCOUNTER — OUTPATIENT (OUTPATIENT)
Dept: OUTPATIENT SERVICES | Facility: HOSPITAL | Age: 35
LOS: 1 days | End: 2020-05-01
Payer: MEDICAID

## 2020-05-01 PROCEDURE — G9001: CPT

## 2020-05-04 DIAGNOSIS — Z71.89 OTHER SPECIFIED COUNSELING: ICD-10-CM

## 2020-05-04 PROBLEM — J45.909 UNSPECIFIED ASTHMA, UNCOMPLICATED: Chronic | Status: ACTIVE | Noted: 2020-04-10

## 2020-05-04 PROBLEM — E28.2 POLYCYSTIC OVARIAN SYNDROME: Chronic | Status: ACTIVE | Noted: 2020-04-10

## 2020-11-24 ENCOUNTER — OUTPATIENT (OUTPATIENT)
Dept: OUTPATIENT SERVICES | Facility: HOSPITAL | Age: 35
LOS: 1 days | Discharge: HOME | End: 2020-11-24

## 2020-11-24 DIAGNOSIS — G31.84 MILD COGNITIVE IMPAIRMENT OF UNCERTAIN OR UNKNOWN ETIOLOGY: ICD-10-CM

## 2020-11-24 DIAGNOSIS — F43.12 POST-TRAUMATIC STRESS DISORDER, CHRONIC: ICD-10-CM

## 2020-11-25 PROBLEM — Z00.00 ENCOUNTER FOR PREVENTIVE HEALTH EXAMINATION: Status: ACTIVE | Noted: 2020-11-25

## 2020-12-21 ENCOUNTER — APPOINTMENT (OUTPATIENT)
Dept: NEUROLOGY | Facility: CLINIC | Age: 35
End: 2020-12-21

## 2021-01-26 ENCOUNTER — APPOINTMENT (OUTPATIENT)
Dept: PLASTIC SURGERY | Facility: CLINIC | Age: 36
End: 2021-01-26
Payer: MEDICAID

## 2021-01-26 ENCOUNTER — TRANSCRIPTION ENCOUNTER (OUTPATIENT)
Age: 36
End: 2021-01-26

## 2021-01-26 VITALS — BODY MASS INDEX: 34.15 KG/M2 | HEIGHT: 64 IN | WEIGHT: 200 LBS

## 2021-01-26 DIAGNOSIS — Z87.09 PERSONAL HISTORY OF OTHER DISEASES OF THE RESPIRATORY SYSTEM: ICD-10-CM

## 2021-01-26 DIAGNOSIS — Z78.9 OTHER SPECIFIED HEALTH STATUS: ICD-10-CM

## 2021-01-26 DIAGNOSIS — D48.7 NEOPLASM OF UNCERTAIN BEHAVIOR OF OTHER SPECIFIED SITES: ICD-10-CM

## 2021-01-26 DIAGNOSIS — D48.5 NEOPLASM OF UNCERTAIN BEHAVIOR OF SKIN: ICD-10-CM

## 2021-01-26 DIAGNOSIS — U07.1 COVID-19: ICD-10-CM

## 2021-01-26 PROCEDURE — 99072 ADDL SUPL MATRL&STAF TM PHE: CPT

## 2021-01-26 PROCEDURE — 99203 OFFICE O/P NEW LOW 30 MIN: CPT

## 2021-01-26 RX ORDER — ALBUTEROL SULFATE 2.5 MG/3ML
(2.5 MG/3ML) SOLUTION RESPIRATORY (INHALATION)
Refills: 0 | Status: ACTIVE | COMMUNITY

## 2021-01-26 RX ORDER — PREDNISONE 5 MG/1
5 TABLET ORAL
Refills: 0 | Status: ACTIVE | COMMUNITY

## 2021-01-26 NOTE — HISTORY OF PRESENT ILLNESS
[FreeTextEntry1] : 37 yo F with PMHx of Asthma and COVID in April 2020 who presents today for evaluation of left cheek pigmented skin lesion present for several years recently increasing in size and changing in characteristics. Also c/o new right neck lesion. Referred by Dr. Conroy for excision. \par Family hx- aunt skin cancer, unknown type \par \par \par Occupation- LPN\par Nonsmoker

## 2021-01-26 NOTE — PHYSICAL EXAM
[de-identified] : well-developed and groomed female, NAD [de-identified] : NC/AT [de-identified] : PERRL [de-identified] : supple [de-identified] : unlabored breathing  [de-identified] : soft, nontender  [de-identified] : PRAVEENR [de-identified] : Face- left superior cheek with 5mm raised, darkly pigmented skin lesion with hair and slightly irregular borders \par Neck- right neck with subcutaneous slightly pigmented lesion, mildly tender to palpation

## 2021-01-26 NOTE — ASSESSMENT
[FreeTextEntry1] : 35 yo F with left cheek and right neck benign appearing skin lesions. \par \par - recommend excision \par \par as above\par left lateral canthal lesion and right neck cyst\par \par suggest excision of both\par \par Regarding the procedure, we discussed scarring, poor wound healing, bleeding, infection, need for additional surgery, and dissatisfaction with the outcome.  Also discussed possibility of keloid and/or hypertrophic scar formation as well as recurrence.  All questions were answered and risks understood.\par \par \par Due to COVID 19, pre-visit patient instructions were explained to the patient and their symptoms were checked upon arrival.  \par Masks were used by the health care providers and staff and the examination room was cleaned after the patient visit was completed.\par \par office procedure\par

## 2021-02-01 ENCOUNTER — APPOINTMENT (OUTPATIENT)
Dept: NEUROLOGY | Facility: CLINIC | Age: 36
End: 2021-02-01
Payer: MEDICAID

## 2021-02-01 DIAGNOSIS — G43.709 CHRONIC MIGRAINE W/OUT AURA, NOT INTRACTABLE, W/OUT STATUS MIGRAINOSUS: ICD-10-CM

## 2021-02-01 PROCEDURE — 99203 OFFICE O/P NEW LOW 30 MIN: CPT | Mod: 95

## 2021-02-01 NOTE — HISTORY OF PRESENT ILLNESS
[FreeTextEntry1] : Ms. Salcedo is a 37yo woman being seen for evaluation of headaches.  Headaches began in April 2020 when she was diagnosed with COVID19.  She was admitted with sepsis and severe respiratory symptoms but was not intubated.  During her hospitalization she had severe headaches, nausea and dizziness.  Symptoms improved over the next few months but she still gets headaches almost daily.  She saw 2 neurologists and one was treating her for possible pinched nerve and was given gabapentin which did not help and second one was for covid related symptoms and gave her prednisone 10mg on 1/13/2021 and it was reduced to 5mg on 1/26/2021.  She believes the prednisone has helped a little but she still gets the headaches.  She had an MRI brain at [facility] which I reviewed the images for and it shows punctate FLAIR, T2 signal in the left frontal region.\par Her headaches are associated with dizziness, blurry vision and nausea.\par She also has been having a number of other complaints including lesion on her ear which looks discolored and possibly infected.  Having dry eyes recurrently and uses steroid/abx eye drops which helps\par Getting frequent urinary tract infections\par

## 2021-02-01 NOTE — PHYSICAL EXAM
[FreeTextEntry1] : a+Ox3 language and attention normal\par No facial EOMI\par Movements + CORRINE symmetric\par

## 2021-02-01 NOTE — REVIEW OF SYSTEMS
[Feeling Poorly] : feeling poorly [Depression] : depression [Joint Pain] : joint pain [As Noted in HPI] : as noted in HPI [Negative] : Heme/Lymph [de-identified] : ptsd

## 2021-02-01 NOTE — DATA REVIEWED
[de-identified] : reviewed MRI brain and cervical spine form [facility]\par Cspine negative for any discs or foraminal disease\par

## 2021-02-23 ENCOUNTER — APPOINTMENT (OUTPATIENT)
Dept: NEUROLOGY | Facility: CLINIC | Age: 36
End: 2021-02-23

## 2021-03-02 RX ORDER — TOPIRAMATE 25 MG/1
25 TABLET, FILM COATED ORAL
Qty: 60 | Refills: 2 | Status: ACTIVE | COMMUNITY
Start: 2021-02-01 | End: 1900-01-01

## 2021-04-09 ENCOUNTER — LABORATORY RESULT (OUTPATIENT)
Age: 36
End: 2021-04-09

## 2021-04-09 ENCOUNTER — APPOINTMENT (OUTPATIENT)
Dept: PLASTIC SURGERY | Facility: CLINIC | Age: 36
End: 2021-04-09
Payer: MEDICAID

## 2021-04-09 PROCEDURE — 99072 ADDL SUPL MATRL&STAF TM PHE: CPT

## 2021-04-09 PROCEDURE — 13151 CMPLX RPR E/N/E/L 1.1-2.5 CM: CPT | Mod: 59

## 2021-04-09 PROCEDURE — 11441 EXC FACE-MM B9+MARG 0.6-1 CM: CPT

## 2021-04-09 PROCEDURE — 11422 EXC H-F-NK-SP B9+MARG 1.1-2: CPT | Mod: 59

## 2021-04-09 PROCEDURE — 13131 CMPLX RPR F/C/C/M/N/AX/G/H/F: CPT | Mod: 59

## 2021-04-09 NOTE — PROCEDURE
[FreeTextEntry6] : Patient is a 36 year old female with a left lateral canthal lesion measuring approximately 1 x 1 cm and a right neck cyst measuring approximately 1.5 x 1 cm.  \par \par The areas were prepped and draped in the usual fashion.  Local anesthetic was administered using 1% lidocaine with epinephrine.\par \par Lesion and cyst were sharply excised.  Areas irrigated copiously.  Complex wound closure was performed in layers.  The canthal  wound measured approximately 1.8  cm and the neck wound measured approximately 2.2 cm.\par \par Sterile dressing applied.  \par \par Patient tolerated procedure well and understands post-op instructions.\par \par Sutures Used:  5-0 monocryl, 6-0 prolene\par \par Due to COVID 19, pre-visit patient instructions were explained to the patient and their symptoms were checked upon arrival.  \par Masks were used by the health care providers and staff and the examination room was cleaned after the patient visit was completed.\par \par

## 2021-04-16 ENCOUNTER — APPOINTMENT (OUTPATIENT)
Dept: PLASTIC SURGERY | Facility: CLINIC | Age: 36
End: 2021-04-16
Payer: MEDICAID

## 2021-04-16 PROCEDURE — 99024 POSTOP FOLLOW-UP VISIT: CPT

## 2021-04-16 NOTE — PHYSICAL EXAM
[de-identified] : well-developed and groomed female, NAD [de-identified] : NC/AT [de-identified] : PERRL [de-identified] : supple [de-identified] : unlabored breathing  [de-identified] : PRAVEENR [de-identified] : soft, nontender  [de-identified] : Left cheek and right neck incision healing well, sutures C/D/I, incisional tenderness as expected, no significant erythema/swelling\par

## 2021-04-16 NOTE — DATA REVIEWED
[FreeTextEntry1] : \par  Tissue Biopsy             Final\par \par No Documents Attached\par \par \par \par \par   ALBA, SLADE                       2\par \par \par \par                  Surgical Final Report\par \par \par \par \par           Final Diagnosis\par           1.  Right neck cyst, excision:\par           -  Fibroconnective tissue with a minute focus suggestive of\par           hemangioma.\par \par           Comment: Deeper levels were obtained and examined.\par \par           2.  Left lateral canthal lesion, excision:\par           -  Intradermal nevus.\par \par           Note: This case was reviewed in intradepartmental QA conference\par           and the diagnosis represents a consensus\par           opinion.\par \par           Verified by: Misti Biswas M.D.\par           (Electronic Signature)\par           Reported on: 04/14/21 14:46 EDT, [address] [facility],\[place]           [place] [address]\par           Phone: [phone]   Fax: [phone]\par           _________________________________________________________________\par \par           Clinical History\par           Excision of right neck cyst, excision of left lateral canthal\par           lesion\par \par           Specimen(s) Submitted\par           1     Right neck cyst\par           2     Left lateral canthal lesion\par \par           Gross Description\par           1.  The specimen is received in formalin, labeled "right neck\par           cyst" and consists of one tan brown fragment of tissue measuring\par           0.6 x 0.4 x 0.1 cm.  The specimen is submitted entirely. (1\par           block)\par \par           2.  The specimen is received in formalin, labeled "left lateral\par           canthal lesion" and consists of one tan brown fragment of tissue\par           measuring 0.9 x 0.6 x 0.2 cm with a pigmented lesion measuring\par           0.5 x 0.4 cm base is inked black and trisected. The specimen is\par           submitted entirely. (1 block)\par \par           Specimen was received and underwent gross examination at [facility], [address] [place] [address],\par           [place] [address].\par \par           04/12/2021 12:30:47 EDT [place]\par \par \par \par \par \par \par \par           ALBA, SLADE                       2\par \par \par \par                  Surgical Final Report\par \par \par \par \par           Perioperative Diagnosis\par           D48.5-Neoplasm of uncertain behavior of skin\par           D48.7-Neoplasm of uncertain behavior of other specified sites\par \par  \par \par  Ordered by: MAURO ALAN IV       Collected/Examined: 09Apr2021 12:25PM       \par Verification Required       Stage: Final       \par  Performed at: [facility] (Med Director: Vicente Oconnell M.D)       Resulted: 14Apr2021 02:46PM       Last Updated: 14Apr2021 02:46PM       Accession: [id number]

## 2021-04-16 NOTE — ASSESSMENT
[FreeTextEntry1] : 35 yo F POD #7 s/p excision of left cheek skin lesion and right neck cyst, doing well\par \par -Pathology reviewed\par -Sutures removed\par -Steri strip applied\par -Wound care and scar creams reviewed\par -Sun protection and routine dermatology f/u\par -F/u PRN\par \par Due to COVID-19, pre-visit patient instructions were explained to the patient and their symptoms were checked upon arrival. Masks were used by the healthcare provider and staff and the examination room was cleaned after the patient visit concluded\par

## 2021-04-16 NOTE — HISTORY OF PRESENT ILLNESS
[FreeTextEntry1] : 37 yo F with PMHx of Asthma and COVID in April 2020 who presents today for evaluation of left cheek pigmented skin lesion present for several years recently increasing in size and changing in characteristics. Also c/o new right neck lesion. Referred by Dr. Conroy for excision. \par Family hx- aunt skin cancer, unknown type \par \par \par Occupation- LPN\par Nonsmoker \par \par Interval hx (4/16/21): Pt presents today POD #7 s/p excision of left cheek skin lesion and right neck cyst. Offers no complaints.

## 2021-06-09 ENCOUNTER — APPOINTMENT (OUTPATIENT)
Dept: PLASTIC SURGERY | Facility: CLINIC | Age: 36
End: 2021-06-09

## 2021-07-21 ENCOUNTER — APPOINTMENT (OUTPATIENT)
Dept: NEUROLOGY | Facility: CLINIC | Age: 36
End: 2021-07-21

## 2022-02-09 ENCOUNTER — OUTPATIENT (OUTPATIENT)
Dept: OUTPATIENT SERVICES | Facility: HOSPITAL | Age: 37
LOS: 1 days | Discharge: HOME | End: 2022-02-09